# Patient Record
Sex: MALE | Race: BLACK OR AFRICAN AMERICAN | URBAN - METROPOLITAN AREA
[De-identification: names, ages, dates, MRNs, and addresses within clinical notes are randomized per-mention and may not be internally consistent; named-entity substitution may affect disease eponyms.]

---

## 2017-01-09 ENCOUNTER — TELEPHONE (OUTPATIENT)
Dept: NEUROLOGY | Facility: CLINIC | Age: 27
End: 2017-01-09

## 2017-01-09 DIAGNOSIS — G40.209 LOCALZ-RLTD SYMPTOMATIC EPILEPSY W CMPLX PART SZ, NOTINTRAC, WO STATUS (H): Primary | ICD-10-CM

## 2017-01-09 RX ORDER — LEVETIRACETAM 750 MG/1
TABLET ORAL
Qty: 150 TABLET | Refills: 3 | Status: SHIPPED | OUTPATIENT
Start: 2017-01-09 | End: 2017-03-10

## 2017-01-09 NOTE — TELEPHONE ENCOUNTER
----- Message from Marcel Cabrera CMA sent at 1/9/2017  9:21 AM CST -----  Regarding: refill  Drug Name: levETIRAcetam (KEPPRA) 750 MG tablet      Dose: 750mg    generic  SIG: taking 2.5 tabs BID                                 Days Supply Needed:      Pharm[place] [facility] [address] [place] [facility] [address] [facility]    Date of Last Appointment: 12/6/16  Next RTC Date: 3/7/17  Has a script already been sent recently: No    Additonal Notes:

## 2017-03-10 ENCOUNTER — OFFICE VISIT (OUTPATIENT)
Dept: NEUROLOGY | Facility: CLINIC | Age: 27
End: 2017-03-10

## 2017-03-10 VITALS
HEART RATE: 67 BPM | WEIGHT: 223 LBS | SYSTOLIC BLOOD PRESSURE: 135 MMHG | BODY MASS INDEX: 30.2 KG/M2 | HEIGHT: 72 IN | DIASTOLIC BLOOD PRESSURE: 60 MMHG | OXYGEN SATURATION: 98 % | RESPIRATION RATE: 20 BRPM

## 2017-03-10 DIAGNOSIS — G40.219 LOCALIZATION-RELATED SYMPTOMATIC EPILEPSY AND EPILEPTIC SYNDROMES WITH COMPLEX PARTIAL SEIZURES, INTRACTABLE, WITHOUT STATUS EPILEPTICUS (H): Primary | ICD-10-CM

## 2017-03-10 DIAGNOSIS — G40.209 LOCALZ-RLTD SYMPTOMATIC EPILEPSY W CMPLX PART SZ, NOTINTRAC, WO STATUS (H): ICD-10-CM

## 2017-03-10 LAB
DEPRECATED CALCIDIOL+CALCIFEROL SERPL-MC: 52 UG/L (ref 20–75)
SODIUM SERPL-SCNC: 136 MMOL/L (ref 133–144)

## 2017-03-10 RX ORDER — LEVETIRACETAM 750 MG/1
TABLET ORAL
Qty: 150 TABLET | Refills: 11 | Status: SHIPPED | OUTPATIENT
Start: 2017-03-10 | End: 2018-01-26

## 2017-03-10 ASSESSMENT — PAIN SCALES - GENERAL: PAINLEVEL: NO PAIN (0)

## 2017-03-10 NOTE — PROGRESS NOTES
[facility] Epilepsy Care Progress Note      Patient:  India Hylton  :  1990   Age:  26 year old   Today's Office Visit:  3/10/2017    Epilepsy Data:    Patient History  Primary Epileptologist/Provider: Ranjit Goddard M.D.  Patient Status: Not yet controlled  Epilepsy Syndrome: Localization-related epilepsy unspecified  Epilepsy Syndrome Status: Preliminary  Age of Onset: 22yo (22 yo)     Tests/Surgery History  Last EEG: 2016  Last MRI: 2014    Seizure Record  Current Visit Date: 03/10/17  Previous Visit Date: 16  Months since last visit: 3.09    Seizure Type 1: Simple partial onset followed by impairment of consciousness  Description of Sz Type 1: joselyn vu followed by stare, amnesia, automatisms.  # of Type 1 Seizure since last visit: 11  Freq. Type 1 / Month: 3.56    Seizure Type 2: Partial seizures with secondary generalization  - with simple partial seizures evolving to generalized seizures  Description of Sz Type 2: joselyn vu followed by collapse, amnesia, stiffening, jerking, often TB  # of Type 2 Seizure since last visit: 0  Freq. Type 2 / Month: 0    History of Present Illness:     No change in seizure characteristics. No convulsive seizures.  Complex partial seizures continue and he keeps a close record on his smart phone.  Numbers have come down from about 6 per month prior to oxcarbazepine to about 3 per month as we optimized carbamazepine.  Nonetheless continues with seizures with impairment.    Last visit suggested formal education regarding surgery. This was ordered but he did not arrange it. Reports that he feels he is doing well.  Reports he does not feel ready for surgery at this time.    Current Outpatient Prescriptions   Medication Sig Dispense Refill     levETIRAcetam (KEPPRA) 750 MG tablet Take two and one half tablets twice per day (total 3750 mg per day) 150 tablet 3     OXcarbazepine (TRILEPTAL) 300 MG tablet Inicrease as instructed until taking four in AM and  four and one half in PM (total 2550 mg per day) 255 tablet 11        Medication Notes:    Taking OXC [phone] (2550/D)  AED Side Effects Discussed: Yes   AED Medication Compliance:  compliant all of the time  Using a pill box:  Yes    Review of Systems:  Lethargy / Tiredness:  No  Nausea / Vomiting:  No  Double Vision:  No  Sleepiness:  No  Depression:  No  Slowed Cognitive Function:  No  Memory Problems:  No  Poor Balance:  No  Dizziness:  No  Appetite Changes:  No  Blurred Vision:  No  Decreased Libido:  No  Sleep Changes:  No  Behavioral Changes:  No  Skin: negative  Respiratory: No shortness of breath  Cardiovascular: negative  Have you experienced a traumatic fall since your last visit: NO  Are these falls related to your seizures: Not Applicable    Other Issues:    No other medical problems. MMood doing OK. Not suicidal.  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 Takes green lilne to work.  Is patient safe to drive:  No    Exam:    /60  Pulse 67  Resp 20  Ht 6' (182.9 cm)  Wt 223 lb (101.2 kg)  SpO2 98%  BMI 30.24 kg/m2     Wt Readings from Last 5 Encounters:   03/10/17 223 lb (101.2 kg)   12/06/16 230 lb (104.3 kg)   06/23/16 230 lb 6.4 oz (104.5 kg)   03/18/16 233 lb (105.7 kg)     PHYSICAL EXAMINATION:  Normal straightaway gait including tandem, Romberg, 1-foot stand.  Visual fields are full.  Extraocular movements intact.  Smile symmetrical.  Facial sensation equal.  Tongue is midline and strong without signs laceration.  There is no drift, pronation or tremor.  Proximal and distal strength full in upper and lower extremities.  Finger-finger-nose and heel-knee-shin is done well. He very pleasant and cooperative young man who asks thoughtful questions and exhibits greater maturity than one might expect for his age.  Mental status exam is grossly intact and no obvious indications of depression or anxiety on examination.        IMPRESSION:    1.  Good  "story for localization-related epilepsy; simple to complex partial seizures, occasional secondary generalization.  Clinical features are consistent with temporal lobe onset or early temporal lobe propagation.  The clinical features suggest more a nondominant than a dominant temporal lobe onset.  EEG at [facility] with left temporal irritative zone EEG done at [facility] suggests right temporal irritative zone.  He could have bitemporal epilepsy.  Outside MRI normal; not clear this is a high quality study; if normal may have neocortical epilepsy. History not strongly suggestive of mesial temporal epilepsy.  2.  Refractory to phenytoin, levetiracetam, high dose lacosamide and now oxcarbazepine. Has done best with oxcarbazepine levetiracetam but clearly maximized and not seizure free.      DISCUSSION:  Again extensive discussion about treatment options. He medically refractory and further AED treatment not likely to help. On the other hand probably does not have mesiobasal epilepsy and would require grids tho if has unilateral temporal neocortical epilepsy prognosis for cure may be reasonable. We again encouraged presurgical evaluation starting with 3T epilepsy directed MRI. He again remains unexcited about pursuing this at this time. We discussed potential sequellae of chronic partial seizures including cognitive deficits, loss of opportunities that may have a serious vocational and personal impact over time. He acknowledged this but stated that thinking about surgery was \"too much\". We suggested surgery education and he was open to this. He experessed interest in medical cannabis; we talked about the ongoing study and he expressed interest in this. email is [email or web address]; cell [phone].     PLAN:    1.  Continue current levetiracetam and oxcarbazepine.  2. RTC 4 monhts. If no control, we will again suggest evaluation for potential resective epilepsy surgery.  This would entail inpatient video-EEG " monitoring, 3 Roxana epilepsy directed MRI, PET scanning, neuropsychometric testing and intercarotid amytal testing as needed.  3. Will discuss with Dr Carvalho who is PI for Cannabis Epilepsy study whether he is a candidate.  4.  Lamotrigine would appropriate anticonvulsants to consider in the future if patient chooses more anticonvulsant medication treatments.   5. AED levels to confirm compliance, rule out toxicity. Sodium to exclude side effects. Vitamin D level. He currently takes Vit D 2000 IU per day.         KARIS FRANK MD

## 2017-03-10 NOTE — MR AVS SNAPSHOT
After Visit Summary   3/10/2017    India Hylton    MRN: [id number]           Patient Information     Date Of Birth          1990        Visit Information        Provider Department      3/10/2017 2:45 PM Ranjit Goddard MD [facility] Neurology        Today's Diagnoses     Localization-related symptomatic epilepsy and epileptic syndromes with complex partial seizures, intractable, without status epilepticus (H)    -  1    Localz-rltd symptomatic epilepsy w cmplx part sz, notintrac, wo status (H)           Follow-ups after your visit        Follow-up notes from your care team     Return in about 6 months (around 9/10/2017).      Your next 10 appointments already scheduled     Mar 10, 2017  3:45 PM CST   LAB with  LAB   [facility] Lab ([facility])    [address] [place] [id number] [phone]           Patient must bring picture ID.  Patient should be prepared to give a urine specimen  Please do not eat 10-12 hours before your appointment if you are coming in fasting for labs on lipids, cholesterol, or glucose (sugar).  Pregnant women should follow their Care Team instructions. Water with medications is okay. Do not drink coffee or other fluids.   If you have concerns about taking  your medications, please ask at office or if scheduling via Ruckus, send a message by clicking on Secure Messaging, Message Your Care Team.            Sep 22, 2017  2:45 PM CDT   (Arrive by 2:30 PM)   Return Seizure with Ranjit Goddard MD   [facility] Neurology ([facility])    [address] [place] [id number] [phone]              Who to contact     Please call your clinic at [phone] to:    Ask questions about your health    Make or cancel appointments    Discuss your medicines    Learn about your test results    Speak to your doctor   If you have compliments or concerns about an  experience at your clinic, or if you wish to file a complaint, please contact [facility] Patient Relations at [phone] or email us at [email or web address]         Additional Information About Your Visit        iHandlehart Information     SilverPush is an electronic gateway that provides easy, online access to your medical records. With SilverPush, you can request a clinic appointment, read your test results, renew a prescription or communicate with your care team.     To sign up for SilverPush visit the website at [email or web address], a [email or web address] of [email or web address]"t   You will be asked to enter the access code listed below, as well as some personal information. Please follow the directions to create your username and password.     Your access code is: [email or web address]  Expires: 2017  6:30 AM     Your access code will  in 90 days. If you need help or a new code, please contact your [facility] or call [phone] for assistance.        Care EveryWhere ID     This is your Care EveryWhere ID. This could be used by other organizations to access your [place] medical records  [id number]        Your Vitals Were     Pulse Respirations Height Pulse Oximetry BMI (Body Mass Index)       67 20 1.829 m (6') 98% 30.24 kg/m2        Blood Pressure from Last 3 Encounters:   03/10/17 135/60   16 142/74   16 138/66    Weight from Last 3 Encounters:   03/10/17 101.2 kg (223 lb)   16 104.3 kg (230 lb)   16 104.5 kg (230 lb 6.4 oz)              We Performed the Following     Levetiracetam level     Oxcarbazepine level     Sodium  Serum ([facility])     Vitamin D Deficiency (Calcifediol)          Where to get your medicines      These medications were sent to [facility] [address] [place] [facility] [address] [facility]  [address] [facility] [address]     Phone:  [phone]     levETIRAcetam 750 MG tablet          Primary Care Provider    Physician No  Ref-Primary       No address on file        Thank you!     Thank you for choosing [facility] NEUROLOGY  for your care. Our goal is always to provide you with excellent care. Hearing back from our patients is one way we can continue to improve our services. Please take a few minutes to complete the written survey that you may receive in the mail after your visit with us. Thank you!             Your Updated Medication List - Protect others around you: Learn how to safely use, store and throw away your medicines at www.disposemymeds.org.          This list is accurate as of: 3/10/17  3:30 PM.  Always use your most recent med list.                   Brand Name Dispense Instructions for use    levETIRAcetam 750 MG tablet    KEPPRA    150 tablet    Take two and one half tablets twice per day (total 3750 mg per day)       OXcarbazepine 300 MG tablet    TRILEPTAL    255 tablet    Inicrease as instructed until taking four in AM and four and one half in PM (total 2550 mg per day)

## 2017-03-10 NOTE — LETTER
3/10/2017       RE: India Hylton  [address]  [facility] [address]     Dear Colleague,    Thank you for referring your patient, India Hylton, to the [facility] NEUROLOGY at [facility]. Please see a copy of my visit note below.    [facility] Epilepsy Care Progress Note      Patient:  India Hylton  :  1990   Age:  26 year old   Today's Office Visit:  3/10/2017    Epilepsy Data:    Patient History  Primary Epileptologist/Provider: Ranjit Goddard M.D.  Patient Status: Not yet controlled  Epilepsy Syndrome: Localization-related epilepsy unspecified  Epilepsy Syndrome Status: Preliminary  Age of Onset: 22yo (22 yo)     Tests/Surgery History  Last EEG: 2016  Last MRI: 2014    Seizure Record  Current Visit Date: 03/10/17  Previous Visit Date: 16  Months since last visit: 3.09    Seizure Type 1: Simple partial onset followed by impairment of consciousness  Description of Sz Type 1: joselyn vu followed by stare, amnesia, automatisms.  # of Type 1 Seizure since last visit: 11  Freq. Type 1 / Month: 3.56    Seizure Type 2: Partial seizures with secondary generalization  - with simple partial seizures evolving to generalized seizures  Description of Sz Type 2: joselyn vu followed by collapse, amnesia, stiffening, jerking, often TB  # of Type 2 Seizure since last visit: 0  Freq. Type 2 / Month: 0    History of Present Illness:     No change in seizure characteristics. No convulsive seizures.  Complex partial seizures continue and he keeps a close record on his smart phone.  Numbers have come down from about 6 per month prior to oxcarbazepine to about 3 per month as we optimized carbamazepine.  Nonetheless continues with seizures with impairment.    Last visit suggested formal education regarding surgery. This was ordered but he did not arrange it. Reports that he feels he is doing well.  Reports he does not feel ready for surgery at this  time.    Current Outpatient Prescriptions   Medication Sig Dispense Refill     levETIRAcetam (KEPPRA) 750 MG tablet Take two and one half tablets twice per day (total 3750 mg per day) 150 tablet 3     OXcarbazepine (TRILEPTAL) 300 MG tablet Inicrease as instructed until taking four in AM and four and one half in PM (total 2550 mg per day) 255 tablet 11        Medication Notes:    Taking OXC [phone] (2550/D)  AED Side Effects Discussed: Yes   AED Medication Compliance:  compliant all of the time  Using a pill box:  Yes    Other Issues:    No other medical problems. MMood doing OK. Not suicidal.  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  Is patient safe to drive:  No    Exam:    /60  Pulse 67  Resp 20  Ht 6' (182.9 cm)  Wt 223 lb (101.2 kg)  SpO2 98%  BMI 30.24 kg/m2     Wt Readings from Last 5 Encounters:   03/10/17 223 lb (101.2 kg)   12/06/16 230 lb (104.3 kg)   06/23/16 230 lb 6.4 oz (104.5 kg)   03/18/16 233 lb (105.7 kg)     PHYSICAL EXAMINATION:  Normal straightaway gait including tandem, Romberg, 1-foot stand.  Visual fields are full.  Extraocular movements intact.  Smile symmetrical.  Facial sensation equal.  Tongue is midline and strong without signs laceration.  There is no drift, pronation or tremor.  Proximal and distal strength full in upper and lower extremities.  Finger-finger-nose and heel-knee-shin is done well. He very pleasant and cooperative young man who asks thoughtful questions and exhibits greater maturity than one might expect for his age.  Mental status exam is grossly intact and no obvious indications of depression or anxiety on examination.        IMPRESSION:    1.  Good story for localization-related epilepsy; simple to complex partial seizures, occasional secondary generalization.  Clinical features are consistent with temporal lobe onset or early temporal lobe propagation.  The clinical features suggest  "more a nondominant than a dominant temporal lobe onset.  EEG at [facility] with left temporal irritative zone EEG done at [facility] suggests right temporal irritative zone.  He could have bitemporal epilepsy.  Outside MRI normal; not clear this is a high quality study; if normal may have neocortical epilepsy. History not strongly suggestive of mesial temporal epilepsy.  2.  Refractory to phenytoin, levetiracetam, high dose lacosamide and now oxcarbazepine. Has done best with oxcarbazepine levetiracetam but clearly maximized and not seizure free.      DISCUSSION:  Again extensive discussion about treatment options. He medically refractory and further AED treatment not likely to help. On the other hand probably does not have mesiobasal epilepsy and would require grids tho if has unilateral temporal neocortical epilepsy prognosis for cure may be reasonable. We again encouraged presurgical evaluation starting with 3T epilepsy directed MRI. He again remains unexcited about pursuing this at this time. We discussed potential sequellae of chronic partial seizures including cognitive deficits, loss of opportunities that may have a serious vocational and personal impact over time. He acknowledged this but stated that thinking about surgery was \"too much\". We suggested surgery education and he was open to this. He experessed interest in medical cannabis; we talked about the ongoing study and he expressed interest in this. email is [email or web address]; cell [phone].     PLAN:    1.  Continue current levetiracetam and oxcarbazepine.  2. RTC 4 monhts. If no control, we will again suggest evaluation for potential resective epilepsy surgery.  This would entail inpatient video-EEG monitoring, 3 Roxana epilepsy directed MRI, PET scanning, neuropsychometric testing and intercarotid amytal testing as needed.  3. Will discuss with Dr Carvalho who is PI for Cannabis Epilepsy study whether he is a candidate.  4.  Lamotrigine would " appropriate anticonvulsants to consider in the future if patient chooses more anticonvulsant medication treatments.   5. AED levels to confirm compliance, rule out toxicity. Sodium to exclude side effects. Vitamin D level. He currently takes Vit D 2000 IU per day.     KARIS FRANK MD

## 2017-03-14 LAB
10OH-CARBAZEPINE SERPL-MCNC: 28.7 UG/ML
LEVETIRACETAM SERPL-MCNC: 25.2 UG/ML

## 2017-04-03 ENCOUNTER — OFFICE VISIT (OUTPATIENT)
Dept: NEUROLOGY | Facility: CLINIC | Age: 27
End: 2017-04-03

## 2017-04-03 VITALS
DIASTOLIC BLOOD PRESSURE: 87 MMHG | SYSTOLIC BLOOD PRESSURE: 151 MMHG | BODY MASS INDEX: 30.61 KG/M2 | WEIGHT: 226 LBS | HEIGHT: 72 IN | HEART RATE: 62 BPM

## 2017-04-03 DIAGNOSIS — G40.219 LOCALIZATION-RELATED SYMPTOMATIC EPILEPSY AND EPILEPTIC SYNDROMES WITH COMPLEX PARTIAL SEIZURES, INTRACTABLE, WITHOUT STATUS EPILEPTICUS (H): Primary | ICD-10-CM

## 2017-04-03 NOTE — MR AVS SNAPSHOT
After Visit Summary   4/3/2017    India Hylton    MRN: [id number]           Patient Information     Date Of Birth          1990        Visit Information        Provider Department      4/3/2017 9:30 AM Alonzo Carvalho MD [facility] Epilepsy Care         Follow-ups after your visit        Your next 10 appointments already scheduled     Sep 22, 2017  2:45 PM CDT   (Arrive by 2:30 PM)   Return Seizure with Ranjit Goddard MD   [facility] Neurology ([facility])    [address] [place] [address] [phone]              Who to contact     Please call your clinic at [phone] to:    Ask questions about your health    Make or cancel appointments    Discuss your medicines    Learn about your test results    Speak to your doctor   If you have compliments or concerns about an experience at your clinic, or if you wish to file a complaint, please contact [facility] Patient Relations at [phone] or email us at [email or web address]         Additional Information About Your Visit        MyChart Information     Ahometo is an electronic gateway that provides easy, online access to your medical records. With Ahometo, you can request a clinic appointment, read your test results, renew a prescription or communicate with your care team.     To sign up for Ahometo visit the website at [email or web address]   You will be asked to enter the access code listed below, as well as some personal information. Please follow the directions to create your username and password.     Your access code is: [email or web address]  Expires: 2017  7:30 AM     Your access code will  in 90 days. If you need help or a new code, please contact your [facility] or call [phone] for assistance.        Care EveryWhere ID     This is your Care EveryWhere ID. This could be used by other  "organizations to access your [place] medical records  [id number]        Your Vitals Were     Pulse Height BMI (Body Mass Index)             62 6' 0.01\" (182.9 cm) 30.64 kg/m2          Blood Pressure from Last 3 Encounters:   04/03/17 151/87   03/10/17 135/60   12/06/16 142/74    Weight from Last 3 Encounters:   04/03/17 226 lb (102.5 kg)   03/10/17 223 lb (101.2 kg)   12/06/16 230 lb (104.3 kg)              Today, you had the following     No orders found for display         Today's Medication Changes          These changes are accurate as of: 4/3/17 10:08 AM.  If you have any questions, ask your nurse or doctor.               These medicines have changed or have updated prescriptions.        Dose/Directions    OXcarbazepine 300 MG tablet   Commonly known as:  TRILEPTAL   This may have changed:  additional instructions   Used for:  Localization-related (focal) (partial) symptomatic epilepsy and epileptic syndromes with complex partial seizures, not intractable, without status epilepticus (H)        Inicrease as instructed until taking four in AM and four and one half in PM (total 2550 mg per day)   Quantity:  255 tablet   Refills:  11                Primary Care Provider    Physician No Ref-Primary       No address on file        Thank you!     Thank you for choosing [facility]  for your care. Our goal is always to provide you with excellent care. Hearing back from our patients is one way we can continue to improve our services. Please take a few minutes to complete the written survey that you may receive in the mail after your visit with us. Thank you!             Your Updated Medication List - Protect others around you: Learn how to safely use, store and throw away your medicines at www.disposemymeds.org.          This list is accurate as of: 4/3/17 10:08 AM.  Always use your most recent med list.                   Brand Name Dispense Instructions for use    levETIRAcetam 750 MG tablet    KEPPRA    " 150 tablet    Take two and one half tablets twice per day (total 3750 mg per day)       OXcarbazepine 300 MG tablet    TRILEPTAL    255 tablet    Inicrease as instructed until taking four in AM and four and one half in PM (total 2550 mg per day)

## 2017-04-03 NOTE — LETTER
4/3/2017       RE: India Hylton  : 1990   MRN: [id number]      Dear Colleague,    Thank you for referring your patient, India Hylton, to the [facility]PS[facility]. Please see a copy of my visit note below.    [facility] [place] [facility]      Patient:  India Hylton  :  1990   Age:  26 year old   Today's Office Visit:  3/10/2017    Epilepsy Data:    Patient History  Primary Epileptologist/Provider: Ranjit Goddard M.D.  Patient Status: Not yet controlled  Epilepsy Syndrome: Localization-related epilepsy unspecified  Epilepsy Syndrome Status: Preliminary  Age of Onset: 24yo (22 yo)     Tests/Surgery History  Last EEG: 2016  Last MRI: 2014    Seizure Record  Current Visit Date: 03/10/17  Previous Visit Date: 16  Months since last visit: 3.09    Seizure Type 1: Simple partial onset followed by impairment of consciousness  Description of Sz Type 1: joselyn vu followed by stare, amnesia, automatisms.  # of Type 1 Seizure since last visit: 11  Freq. Type 1 / Month: 3.56    Seizure Type 2: Partial seizures with secondary generalization  - with simple partial seizures evolving to generalized seizures  Description of Sz Type 2: joselyn vu followed by collapse, amnesia, stiffening, jerking, often TB  # of Type 2 Seizure since last visit: 0  Freq. Type 2 / Month: 0    History of Present Illness:     No change in seizure characteristics. No convulsive seizures.  Complex partial seizures continue and he keeps a close record on his smart phone.  Numbers have come down from about 6 per month prior to oxcarbazepine to about 3 per month as we optimized carbamazepine.  Nonetheless continues with seizures with impairment.    Last visit suggested formal education regarding surgery. This was ordered but he did not arrange it. Reports that he feels he is doing well.  Reports he does not feel ready for surgery at this  "time.    Current Outpatient Prescriptions   Medication Sig Dispense Refill     levETIRAcetam (KEPPRA) 750 MG tablet Take two and one half tablets twice per day (total 3750 mg per day) 150 tablet 11     OXcarbazepine (TRILEPTAL) 300 MG tablet Inicrease as instructed until taking four in AM and four and one half in PM (total 2550 mg per day) (Patient taking differently: 4 tabs in the am and 4.5 tabs in the hs) 255 tablet 11        Medication Notes:    Taking OXC [phone] (2550/D)  AED Side Effects Discussed: Yes   AED Medication Compliance:  compliant all of the time  Using a pill box:  Yes    Review of Systems:  Lethargy / Tiredness:  No  Nausea / Vomiting:  No  Double Vision:  No  Sleepiness:  No  Depression:  No  Slowed Cognitive Function:  No  Memory Problems:  No  Poor Balance:  No  Dizziness:  No  Appetite Changes:  No  Blurred Vision:  No  Decreased Libido:  No  Sleep Changes:  No  Behavioral Changes:  No  Skin: negative  Respiratory: No shortness of breath  Cardiovascular: negative  Have you experienced a traumatic fall since your last visit: NO  Are these falls related to your seizures: Not Applicable    Other Issues:    No other medical problems. MMood doing OK. Not suicidal.  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 Takes green lilne to work.  Is patient safe to drive:  No    Exam:    /87  Pulse 62  Ht 6' 0.01\" (182.9 cm)  Wt 226 lb (102.5 kg)  BMI 30.64 kg/m2     Wt Readings from Last 5 Encounters:   04/03/17 226 lb (102.5 kg)   03/10/17 223 lb (101.2 kg)   12/06/16 230 lb (104.3 kg)   06/23/16 230 lb 6.4 oz (104.5 kg)   03/18/16 233 lb (105.7 kg)     PHYSICAL EXAMINATION:  Normal straightaway gait including tandem, Romberg, 1-foot stand.  Visual fields are full.  Extraocular movements intact.  Smile symmetrical.  Facial sensation equal.  Tongue is midline and strong without signs laceration.  There is no drift, pronation or tremor.  Proximal and " "distal strength full in upper and lower extremities.  Finger-finger-nose and heel-knee-shin is done well. He very pleasant and cooperative young man who asks thoughtful questions and exhibits greater maturity than one might expect for his age.  Mental status exam is grossly intact and no obvious indications of depression or anxiety on examination.        IMPRESSION:    1.  Good story for localization-related epilepsy; simple to complex partial seizures, occasional secondary generalization.  Clinical features are consistent with temporal lobe onset or early temporal lobe propagation.  The clinical features suggest more a nondominant than a dominant temporal lobe onset.  EEG at [facility] with left temporal irritative zone EEG done at [facility] suggests right temporal irritative zone.  He could have bitemporal epilepsy.  Outside MRI normal; not clear this is a high quality study; if normal may have neocortical epilepsy. History not strongly suggestive of mesial temporal epilepsy.  2.  Refractory to phenytoin, levetiracetam, high dose lacosamide and now oxcarbazepine. Has done best with oxcarbazepine levetiracetam but clearly maximized and not seizure free.      DISCUSSION:  Again extensive discussion about treatment options. He medically refractory and further AED treatment not likely to help. On the other hand probably does not have mesiobasal epilepsy and would require grids tho if has unilateral temporal neocortical epilepsy prognosis for cure may be reasonable. We again encouraged presurgical evaluation starting with 3T epilepsy directed MRI. He again remains unexcited about pursuing this at this time. We discussed potential sequellae of chronic partial seizures including cognitive deficits, loss of opportunities that may have a serious vocational and personal impact over time. He acknowledged this but stated that thinking about surgery was \"too much\". We suggested surgery education and he was open to this. " He experessed interest in medical cannabis; we talked about the ongoing study and he expressed interest in this. email is [email or web address]; cell [phone].     PLAN:    1.  Continue current levetiracetam and oxcarbazepine.  2. RTC 4 monhts. If no control, we will again suggest evaluation for potential resective epilepsy surgery.  This would entail inpatient video-EEG monitoring, 3 Roxana epilepsy directed MRI, PET scanning, neuropsychometric testing and intercarotid amytal testing as needed.  3. Will discuss with Dr Carvalho who is PI for Cannabis Epilepsy study whether he is a candidate.  4.  Lamotrigine would appropriate anticonvulsants to consider in the future if patient chooses more anticonvulsant medication treatments.   5. AED levels to confirm compliance, rule out toxicity. Sodium to exclude side effects. Vitamin D level. He currently takes Vit D 2000 IU per day.         KARIS FRANK MD       Again, thank you for allowing me to participate in the care of your patient.      Sincerely,    Alonzo Carvalho MD

## 2017-04-18 NOTE — PROGRESS NOTES
[facility] Epilepsy [facility] Progress Note      Patient:  India Hylton  :  1990   Age:  26 year old   Today's Office Visit:  3/10/2017    Epilepsy Data:    Patient History  Primary Epileptologist/Provider: Ranjit Goddard M.D.  Patient Status: Not yet controlled  Epilepsy Syndrome: Localization-related epilepsy unspecified  Epilepsy Syndrome Status: Preliminary  Age of Onset: 24yo (22 yo)     Tests/Surgery History  Last EEG: 2016  Last MRI: 2014    Seizure Record  Current Visit Date: 03/10/17  Previous Visit Date: 16  Months since last visit: 3.09    Seizure Type 1: Simple partial onset followed by impairment of consciousness  Description of Sz Type 1: joselyn vu followed by stare, amnesia, automatisms.  # of Type 1 Seizure since last visit: 11  Freq. Type 1 / Month: 3.56    Seizure Type 2: Partial seizures with secondary generalization  - with simple partial seizures evolving to generalized seizures  Description of Sz Type 2: joselyn vu followed by collapse, amnesia, stiffening, jerking, often TB  # of Type 2 Seizure since last visit: 0  Freq. Type 2 / Month: 0    History of Present Illness:     No change in seizure characteristics. No convulsive seizures.  Complex partial seizures continue and he keeps a close record on his smart phone.  Numbers have come down from about 6 per month prior to oxcarbazepine to about 3 per month as we optimized carbamazepine.  Nonetheless continues with seizures with impairment.    Last visit suggested formal education regarding surgery. This was ordered but he did not arrange it. Reports that he feels he is doing well.  Reports he does not feel ready for surgery at this time.    Current Outpatient Prescriptions   Medication Sig Dispense Refill     levETIRAcetam (KEPPRA) 750 MG tablet Take two and one half tablets twice per day (total 3750 mg per day) 150 tablet 11     OXcarbazepine (TRILEPTAL) 300 MG tablet Inicrease as instructed until taking four in AM and  "four and one half in PM (total 2550 mg per day) (Patient taking differently: 4 tabs in the am and 4.5 tabs in the hs) 255 tablet 11        Medication Notes:    Taking OXC [phone] (2550/D)  AED Side Effects Discussed: Yes   AED Medication Compliance:  compliant all of the time  Using a pill box:  Yes    Review of Systems:  Lethargy / Tiredness:  No  Nausea / Vomiting:  No  Double Vision:  No  Sleepiness:  No  Depression:  No  Slowed Cognitive Function:  No  Memory Problems:  No  Poor Balance:  No  Dizziness:  No  Appetite Changes:  No  Blurred Vision:  No  Decreased Libido:  No  Sleep Changes:  No  Behavioral Changes:  No  Skin: negative  Respiratory: No shortness of breath  Cardiovascular: negative  Have you experienced a traumatic fall since your last visit: NO  Are these falls related to your seizures: Not Applicable    Other Issues:    No other medical problems. MMood doing OK. Not suicidal.  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  Is patient safe to drive:  No    Exam:    /87  Pulse 62  Ht 6' 0.01\" (182.9 cm)  Wt 226 lb (102.5 kg)  BMI 30.64 kg/m2     Wt Readings from Last 5 Encounters:   04/03/17 226 lb (102.5 kg)   03/10/17 223 lb (101.2 kg)   12/06/16 230 lb (104.3 kg)   06/23/16 230 lb 6.4 oz (104.5 kg)   03/18/16 233 lb (105.7 kg)     PHYSICAL EXAMINATION:  Normal straightaway gait including tandem, Romberg, 1-foot stand.  Visual fields are full.  Extraocular movements intact.  Smile symmetrical.  Facial sensation equal.  Tongue is midline and strong without signs laceration.  There is no drift, pronation or tremor.  Proximal and distal strength full in upper and lower extremities.  Finger-finger-nose and heel-knee-shin is done well. He very pleasant and cooperative young man who asks thoughtful questions and exhibits greater maturity than one might expect for his age.  Mental status exam is grossly intact and no " "obvious indications of depression or anxiety on examination.        IMPRESSION:    1.  Good story for localization-related epilepsy; simple to complex partial seizures, occasional secondary generalization.  Clinical features are consistent with temporal lobe onset or early temporal lobe propagation.  The clinical features suggest more a nondominant than a dominant temporal lobe onset.  EEG at [facility][1] with left temporal irritative zone EEG done at [facility] suggests right temporal irritative zone.  He could have bitemporal epilepsy.  Outside MRI normal; not clear this is a high quality study; if normal may have neocortical epilepsy. History not strongly suggestive of mesial temporal epilepsy.  2.  Refractory to phenytoin, levetiracetam, high dose lacosamide and now oxcarbazepine. Has done best with oxcarbazepine levetiracetam but clearly maximized and not seizure free.      DISCUSSION:  Again extensive discussion about treatment options. He medically refractory and further AED treatment not likely to help. On the other hand probably does not have mesiobasal epilepsy and would require grids tho if has unilateral temporal neocortical epilepsy prognosis for cure may be reasonable. We again encouraged presurgical evaluation starting with 3T epilepsy directed MRI. He again remains unexcited about pursuing this at this time. We discussed potential sequellae of chronic partial seizures including cognitive deficits, loss of opportunities that may have a serious vocational and personal impact over time. He acknowledged this but stated that thinking about surgery was \"too much\". We suggested surgery education and he was open to this. He experessed interest in medical cannabis; we talked about the ongoing study and he expressed interest in this. email is [email or web address]; cell [phone].     PLAN:    1.  Continue current levetiracetam and oxcarbazepine.  2. RTC 4 monhts. If no control, we will again suggest " evaluation for potential resective epilepsy surgery.  This would entail inpatient video-EEG monitoring, 3 Roxana epilepsy directed MRI, PET scanning, neuropsychometric testing and intercarotid amytal testing as needed.  3. Will discuss with Dr Carvalho who is PI for Cannabis Epilepsy study whether he is a candidate.  4.  Lamotrigine would appropriate anticonvulsants to consider in the future if patient chooses more anticonvulsant medication treatments.   5. AED levels to confirm compliance, rule out toxicity. Sodium to exclude side effects. Vitamin D level. He currently takes Vit D 2000 IU per day.         KARIS FRANK MD

## 2017-04-24 DIAGNOSIS — G40.219 LOCALIZATION-RELATED SYMPTOMATIC EPILEPSY AND EPILEPTIC SYNDROMES WITH COMPLEX PARTIAL SEIZURES, INTRACTABLE, WITHOUT STATUS EPILEPTICUS (H): ICD-10-CM

## 2017-04-24 LAB — SODIUM SERPL-SCNC: 140 MMOL/L (ref 133–144)

## 2017-04-25 LAB
10OH-CARBAZEPINE SERPL-MCNC: 31.4 UG/ML
LEVETIRACETAM SERPL-MCNC: 47.8 UG/ML

## 2017-05-30 ENCOUNTER — OFFICE VISIT (OUTPATIENT)
Dept: NEUROLOGY | Facility: CLINIC | Age: 27
End: 2017-05-30

## 2017-05-30 DIAGNOSIS — G40.219 LOCALIZATION-RELATED SYMPTOMATIC EPILEPSY AND EPILEPTIC SYNDROMES WITH COMPLEX PARTIAL SEIZURES, INTRACTABLE, WITHOUT STATUS EPILEPTICUS (H): Primary | ICD-10-CM

## 2017-05-30 NOTE — MR AVS SNAPSHOT
After Visit Summary   2017    India Hylton    MRN: [id number]           Patient Information     Date Of Birth          1990        Visit Information        Provider Department      2017 10:00 AM Alon[facility] Carvalho [facility]        Today's Diagnoses     Localization-related symptomatic epilepsy and epileptic syndromes with complex partial seizures, intractable, without status epilepticus (H)    -  1       Follow-ups after your visit        Your next 10 appointments already scheduled     Sep 22, 2017  2:45 PM CDT   (Arrive by 2:30 PM)   Return Seizure with Ranjit Goddard MD   [facility] ([facility] [place])    [address] [place] [address]  3rd [facility] [address] [phone]              Who to contact     Please call your clinic at [phone] to:    Ask questions about your health    Make or cancel appointments    Discuss your medicines    Learn about your test results    Speak to your doctor   If you have compliments or concerns about an experience at your clinic, or if you wish to file a complaint, please contact [facility] Patient Relations at [phone] or email us at [email or web address]         Additional Information About Your Visit        MyChart Information     [employer] is an electronic gateway that provides easy, online access to your medical records. With [employer], you can request a clinic appointment, read your test results, renew a prescription or communicate with your care team.     To sign up for [employer] visit the website at [email or web address]   You will be asked to enter the access code listed below, as well as some personal information. Please follow the directions to create your username and password.     Your access code is: [email or web address]  Expires: 2017  7:30 AM     Your access code will  in 90 days. If you need help or a new code, please contact your  [facility] or call [phone] for assistance.        Care EveryWhere ID     This is your Care EveryWhere ID. This could be used by other organizations to access your [facility] medical records  [id number]         Blood Pressure from Last 3 Encounters:   04/03/17 151/87   03/10/17 135/60   12/06/16 142/74    Weight from Last 3 Encounters:   04/03/17 226 lb (102.5 kg)   03/10/17 223 lb (101.2 kg)   12/06/16 230 lb (104.3 kg)              Today, you had the following     No orders found for display         Today's Medication Changes          These changes are accurate as of: 5/30/17 11:59 PM.  If you have any questions, ask your nurse or doctor.               These medicines have changed or have updated prescriptions.        Dose/Directions    OXcarbazepine 300 MG tablet   Commonly known as:  TRILEPTAL   This may have changed:  additional instructions   Used for:  Localization-related (focal) (partial) symptomatic epilepsy and epileptic syndromes with complex partial seizures, not intractable, without status epilepticus (H)        Inicrease as instructed until taking four in AM and four and one half in PM (total 2550 mg per day)   Quantity:  255 tablet   Refills:  11                Primary Care Provider    Physician No Ref-Primary       No address on file        Thank you!     Thank you for choosing [facility] EPILEPSY [facility]  for your care. Our goal is always to provide you with excellent care. Hearing back from our patients is one way we can continue to improve our services. Please take a few minutes to complete the written survey that you may receive in the mail after your visit with us. Thank you!             Your Updated Medication List - Protect others around you: Learn how to safely use, store and throw away your medicines at www.disposemymeds.org.          This list is accurate as of: 5/30/17 11:59 PM.  Always use your most recent med list.                   Brand Name Dispense  Instructions for use    levETIRAcetam 750 MG tablet    KEPPRA    150 tablet    Take two and one half tablets twice per day (total 3750 mg per day)       OXcarbazepine 300 MG tablet    TRILEPTAL    255 tablet    Inicrease as instructed until taking four in AM and four and one half in PM (total 2550 mg per day)

## 2017-05-30 NOTE — LETTER
2017       RE: India Hylton  : 1990   MRN: [id number]      Dear Colleague,    Thank you for referring your patient, India Hylton, to the [facility] EPILEPSY CARE at [facility]. Please see a copy of my visit note below.    [facility] [facility]      Patient:  India Hylton  :  1990   Age:  26 year old   Today's Office Visit: 2017    History sincwe last visit:  He completed the trial of CBD with food and without food and 18 days of 100 tid. He had no seizures during the 18 days; usually would have 1 or 2 in this interval. Also feels mood was better.  Epilepsy Data:    Patient History  Primary Epileptologist/Provider: Ranjit Goddard M.D.  Patient Status: Not yet controlled  Epilepsy Syndrome: Localization-related epilepsy unspecified  Epilepsy Syndrome Status: Preliminary  Age of Onset: 22yo (22 yo)     Tests/Surgery History  Last EEG: 2016  Last MRI: 2014    Seizure Record  Current Visit Date: 03/10/17  Previous Visit Date: 16  Months since last visit: 3.09    Seizure Type 1: Simple partial onset followed by impairment of consciousness  Description of Sz Type 1: joselyn vu followed by stare, amnesia, automatisms.  # of Type 1 Seizure since last visit: 11  Freq. Type 1 / Month: 3.56    Seizure Type 2: Partial seizures with secondary generalization  - with simple partial seizures evolving to generalized seizures  Description of Sz Type 2: joselyn vu followed by collapse, amnesia, stiffening, jerking, often TB  # of Type 2 Seizure since last visit: 0  Freq. Type 2 / Month: 0    History of Present Illness:     No change in seizure characteristics. No convulsive seizures.  Complex partial seizures continue and he keeps a close record on his smart phone.  Numbers have come down from about 6 per month prior to oxcarbazepine to about 3 per month as we optimized carbamazepine.  Nonetheless continues with seizures with  impairment.    Last visit suggested formal education regarding surgery. This was ordered but he did not arrange it. Reports that he feels he is doing well.  Reports he does not feel ready for surgery at this time.    Current Outpatient Prescriptions   Medication Sig Dispense Refill     levETIRAcetam (KEPPRA) 750 MG tablet Take two and one half tablets twice per day (total 3750 mg per day) 150 tablet 11     OXcarbazepine (TRILEPTAL) 300 MG tablet Inicrease as instructed until taking four in AM and four and one half in PM (total 2550 mg per day) (Patient taking differently: 4 tabs in the am and 4.5 tabs in the hs) 255 tablet 11        Medication Notes:    Taking OXC [phone] (2550/D)  AED Side Effects Discussed: Yes   AED Medication Compliance:  compliant all of the time  Using a pill box:  Yes    Review of Systems:  Lethargy / Tiredness:  No  Nausea / Vomiting:  No  Double Vision:  No  Sleepiness:  No  Depression:  No  Slowed Cognitive Function:  No  Memory Problems:  No  Poor Balance:  No  Dizziness:  No  Appetite Changes:  No  Blurred Vision:  No  Decreased Libido:  No  Sleep Changes:  No  Behavioral Changes:  No  Skin: negative  Respiratory: No shortness of breath  Cardiovascular: negative  Have you experienced a traumatic fall since your last visit: NO  Are these falls related to your seizures: Not Applicable    Other Issues:    No other medical problems. MMood doing OK. Not suicidal.  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  Is patient safe to drive:  No    Exam:    There were no vitals taken for this visit.     Wt Readings from Last 5 Encounters:   04/03/17 226 lb (102.5 kg)   03/10/17 223 lb (101.2 kg)   12/06/16 230 lb (104.3 kg)   06/23/16 230 lb 6.4 oz (104.5 kg)   03/18/16 233 lb (105.7 kg)     PHYSICAL EXAMINATION:  Normal straightaway gait including tandem, Romberg, 1-foot stand.  Visual fields are full.  Extraocular movements  intact.  Smile symmetrical.  Facial sensation equal.  Tongue is midline and strong without signs laceration.  There is no drift, pronation or tremor.  Proximal and distal strength full in upper and lower extremities.  Finger-finger-nose and heel-knee-shin is done well. He very pleasant and cooperative young man who asks thoughtful questions and exhibits greater maturity than one might expect for his age.  Mental status exam is grossly intact and no obvious indications of depression or anxiety on examination.        IMPRESSION:    1.  Good story for localization-related epilepsy; simple to complex partial seizures, occasional secondary generalization.  Clinical features are consistent with temporal lobe onset or early temporal lobe propagation.  The clinical features suggest more a nondominant than a dominant temporal lobe onset.  EEG at [facility] with left temporal irritative zone EEG done at [facility] suggests right temporal irritative zone.  He could have bitemporal epilepsy.  Outside MRI normal; not clear this is a high quality study; if normal may have neocortical epilepsy. History not strongly suggestive of mesial temporal epilepsy.  2.  Refractory to phenytoin, levetiracetam, high dose lacosamide and now oxcarbazepine. Has done best with oxcarbazepine levetiracetam but clearly maximized and not seizure free.      DISCUSSION:   Had a positive result with CBD 100mg capsules tid both for fewer seizures and well being.   PLAN:    1.  Continue current levetiracetam and oxcarbazepine.  2. D/C CBD now  As supply is ending and then work with CBD dispensary to continue it.  3. We can write a strong letter for coverage once there is insurance coverage for a CBD product.         Alonzo Carvalho MD

## 2017-05-31 LAB — LEVETIRACETAM SERPL-MCNC: 32 UG/ML

## 2017-06-01 LAB — 10OH-CARBAZEPINE SERPL-MCNC: 23.6 UG/ML

## 2017-06-02 NOTE — PROGRESS NOTES
[facility]      Patient:  India Hylton  :  1990   Age:  26 year old   Today's Office Visit: 2017    History sincwe last visit:  He completed the trial of CBD with food and without food and 18 days of 100 tid. He had no seizures during the 18 days; usually would have 1 or 2 in this interval. Also feels mood was better.  Epilepsy Data:    Patient History  Primary Epileptologist/Provider: Ranjit Goddard M.D.  Patient Status: Not yet controlled  Epilepsy Syndrome: Localization-related epilepsy unspecified  Epilepsy Syndrome Status: Preliminary  Age of Onset: 22yo (24 yo)     Tests/Surgery History  Last EEG: 2016  Last MRI: 2014    Seizure Record  Current Visit Date: 17  Previous Visit Date: 16  Months since last visit: 3.09    Seizure Type 1: Simple partial onset followed by impairment of consciousness  Description of Sz Type 1: joselyn vu followed by stare, amnesia, automatisms.  # of Type 1 Seizure since last visit: 11  Freq. Type 1 / Month: 3.56    Seizure Type 2: Partial seizures with secondary generalization  - with simple partial seizures evolving to generalized seizures  Description of Sz Type 2: joselyn vu followed by collapse, amnesia, stiffening, jerking, often TB  # of Type 2 Seizure since last visit: 0  Freq. Type 2 / Month: 0    History of Present Illness:     No change in seizure characteristics. No convulsive seizures.  Complex partial seizures continue and he keeps a close record on his smart phone.  Numbers have come down from about 6 per month prior to oxcarbazepine to about 3 per month as we optimized carbamazepine.  Nonetheless continues with seizures with impairment.    Last visit suggested formal education regarding surgery. This was ordered but he did not arrange it. Reports that he feels he is doing well.  Reports he does not feel ready for surgery at this time.    Current Outpatient Prescriptions   Medication Sig Dispense Refill      levETIRAcetam (KEPPRA) 750 MG tablet Take two and one half tablets twice per day (total 3750 mg per day) 150 tablet 11     OXcarbazepine (TRILEPTAL) 300 MG tablet Inicrease as instructed until taking four in AM and four and one half in PM (total 2550 mg per day) (Patient taking differently: 4 tabs in the am and 4.5 tabs in the hs) 255 tablet 11        Medication Notes:    Taking OXC [phone] (2550/D)  AED Side Effects Discussed: Yes   AED Medication Compliance:  compliant all of the time  Using a pill box:  Yes    Review of Systems:  Lethargy / Tiredness:  No  Nausea / Vomiting:  No  Double Vision:  No  Sleepiness:  No  Depression:  No  Slowed Cognitive Function:  No  Memory Problems:  No  Poor Balance:  No  Dizziness:  No  Appetite Changes:  No  Blurred Vision:  No  Decreased Libido:  No  Sleep Changes:  No  Behavioral Changes:  No  Skin: negative  Respiratory: No shortness of breath  Cardiovascular: negative  Have you experienced a traumatic fall since your last visit: NO  Are these falls related to your seizures: Not Applicable    Other Issues:    No other medical problems. MMood doing OK. Not suicidal.  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 Takes green lilne to work.  Is patient safe to drive:  No    Exam:    There were no vitals taken for this visit.     Wt Readings from Last 5 Encounters:   04/03/17 226 lb (102.5 kg)   03/10/17 223 lb (101.2 kg)   12/06/16 230 lb (104.3 kg)   06/23/16 230 lb 6.4 oz (104.5 kg)   03/18/16 233 lb (105.7 kg)     PHYSICAL EXAMINATION:  Normal straightaway gait including tandem, Romberg, 1-foot stand.  Visual fields are full.  Extraocular movements intact.  Smile symmetrical.  Facial sensation equal.  Tongue is midline and strong without signs laceration.  There is no drift, pronation or tremor.  Proximal and distal strength full in upper and lower extremities.  Finger-finger-nose and heel-knee-shin is done well. He very  pleasant and cooperative young man who asks thoughtful questions and exhibits greater maturity than one might expect for his age.  Mental status exam is grossly intact and no obvious indications of depression or anxiety on examination.        IMPRESSION:    1.  Good story for localization-related epilepsy; simple to complex partial seizures, occasional secondary generalization.  Clinical features are consistent with temporal lobe onset or early temporal lobe propagation.  The clinical features suggest more a nondominant than a dominant temporal lobe onset.  EEG at [facility] with left temporal irritative zone EEG done at [facility] suggests right temporal irritative zone.  He could have bitemporal epilepsy.  Outside MRI normal; not clear this is a high quality study; if normal may have neocortical epilepsy. History not strongly suggestive of mesial temporal epilepsy.  2.  Refractory to phenytoin, levetiracetam, high dose lacosamide and now oxcarbazepine. Has done best with oxcarbazepine levetiracetam but clearly maximized and not seizure free.      DISCUSSION:   Had a positive result with CBD 100mg capsules tid both for fewer seizures and well being.   PLAN:    1.  Continue current levetiracetam and oxcarbazepine.  2. D/C CBD now  As supply is ending and then work with CBD dispensary to continue it.  3. We can write a strong letter for coverage once there is insurance coverage for a CBD product.         Alonzo Carvalho MD

## 2017-09-22 ENCOUNTER — OFFICE VISIT (OUTPATIENT)
Dept: NEUROLOGY | Facility: CLINIC | Age: 27
End: 2017-09-22

## 2017-09-22 VITALS
BODY MASS INDEX: 30.75 KG/M2 | WEIGHT: 227 LBS | HEART RATE: 62 BPM | HEIGHT: 72 IN | DIASTOLIC BLOOD PRESSURE: 74 MMHG | SYSTOLIC BLOOD PRESSURE: 131 MMHG

## 2017-09-22 DIAGNOSIS — G40.219 LOCALIZATION-RELATED EPILEPSY WITH COMPLEX PARTIAL SEIZURES WITH INTRACTABLE EPILEPSY (H): Primary | ICD-10-CM

## 2017-09-22 DIAGNOSIS — G40.219 LOCALIZATION-RELATED EPILEPSY WITH COMPLEX PARTIAL SEIZURES WITH INTRACTABLE EPILEPSY (H): ICD-10-CM

## 2017-09-22 LAB — SODIUM SERPL-SCNC: 133 MMOL/L (ref 133–144)

## 2017-09-22 ASSESSMENT — PAIN SCALES - GENERAL: PAINLEVEL: NO PAIN (0)

## 2017-09-22 NOTE — PATIENT INSTRUCTIONS
Times of Days  AM  PM       Medication Tablet Size Number of Tablets/Capsules Total Daily Dosage    NOW oxcarbazepine 300 mg  4    4 1/2      2550 mg                      week 1 oxcarbazepine 300 mg  4 /12 4 1/2      2700 mg                      week 2 oxcarbazepine 300 mg  4 1/2    5      2850 mg                      week 3 and after oxcarbazepine 300 mg  5    5      3000 mg                       Keep levetiracetam the same.    Increase oxcarbazepine some as above.  Side effects of too much oxcarbazepine are double vision, blurry vision, unsteadiness.    Get higher magnet strength MRI of brain.    Carry this with you at all times.  CONTINUE TAKING YOUR OTHER MEDICATIONS AS PREVIOUSLY DIRECTED.      * * *Do not store medications in the bathroom.  Keep medications away from children!* * *

## 2017-09-22 NOTE — LETTER
2017       RE: India Hylton  [address]  [facility] [address]     Dear Colleague,    Thank you for referring your patient, India Hylton, to the [facility] NEUROLOGY at [facility]. Please see a copy of my visit note below.    [facility] Epilepsy Care Progress Note      Patient:  India Hylton  :  1990   Age:  26 year old   Today's Office Visit:  2017    Epilepsy Data:    Patient History  Primary Epileptologist/Provider: Ranjit Goddard M.D.  Patient Status: Not yet controlled  Epilepsy Syndrome: Localization-related epilepsy unspecified  Epilepsy Syndrome Status: Preliminary  Age of Onset: 24yo (22 yo)     Tests/Surgery History  Last EEG: 2016  Last MRI: 2014    Seizure Record  Current Visit Date: 17  Previous Visit Date: 03/10/17  Months since last visit: 6.44    Seizure Type 1: Simple partial onset followed by impairment of consciousness  Description of Sz Type 1: joselyn vu followed by stare, amnesia, automatisms.  # of Type 1 Seizure since last visit: 26  Freq. Type 1 / Month: 4.04    Seizure Type 2: Partial seizures with secondary generalization  - with simple partial seizures evolving to generalized seizures  Description of Sz Type 2: joselyn vu followed by collapse, amnesia, stiffening, jerking, often TB  # of Type 2 Seizure since last visit: 0  Freq. Type 2 / Month: 0    History of Present Illness:     Continues with dejavu sensation prior to all seizures.  Also had two seizures with strong smell and preservation of memory but inability to type a text.  These two seizures were very brief. Seizures of this type had not occurred before.    No convulsions.    Involved in cannabis absorption study. Feels that cannabis helped; had only one seizure during that month. However cannot afford to take it long term.      Current Outpatient Prescriptions   Medication Sig Dispense Refill     levETIRAcetam (KEPPRA) 750 MG tablet Take two  "and one half tablets twice per day (total 3750 mg per day) 150 tablet 11     OXcarbazepine (TRILEPTAL) 300 MG tablet Inicrease as instructed until taking four in AM and four and one half in PM (total 2550 mg per day) (Patient taking differently: 4 tabs in the am and 4.5 tabs in the hs) 255 tablet 11        Medication Notes:  Taking  1200 in AM and 1350 in PM      AED Medication Compliance:  compliant all of the time  Using a pill box:  Yes    Review of Systems:  Lethargy / Tiredness:  No  Nausea / Vomiting:  No  Double Vision:  No  Sleepiness:  No  Depression:  No  Slowed Cognitive Function:  No  Memory Problems:  No  Poor Balance:  No  Dizziness:  No  Appetite Changes:  No  Blurred Vision:  No  Decreased Libido:  No  Sleep Changes:  No  Behavioral Changes:  No  Skin: negative  Respiratory: No shortness of breath, dyspnea on exertion, cough, or hemoptysis and No shortness of breath  Cardiovascular: negative  Have you experienced a traumatic fall since your last visit: NO  Are these falls related to your seizures: Not Applicable    Other Issues:    No other health troubles. Mood OK. Not suicidal  Is patient safe to drive:  No    Exam:    /74 (BP Location: Left arm, Patient Position: Sitting, Cuff Size: Adult Large)  Pulse 62  Ht 1.831 m (6' 0.1\")  Wt 103 kg (227 lb)  BMI 30.7 kg/m2     Wt Readings from Last 5 Encounters:   09/22/17 103 kg (227 lb)   04/03/17 102.5 kg (226 lb)   03/10/17 101.2 kg (223 lb)   12/06/16 104.3 kg (230 lb)   06/23/16 104.5 kg (230 lb 6.4 oz)     PHYSICAL EXAMINATION:  Normal straightaway gait including tandem, Romberg, 1-foot stand.  Visual fields are full.  Extraocular movements intact.  Smile symmetrical.  Facial sensation equal.  Tongue is midline and strong without signs laceration.  There is no drift, pronation or tremor.  Proximal and distal strength full in upper and lower extremities.  Finger-finger-nose and heel-knee-shin is done well. Mental status exam is grossly " intact and no obvious indications of depression or anxiety on examination.      Results for GURPREET HOLLY (MRN [id number]) as of 9/22/2017 17:26   Ref. Range 3/10/2017 15:47 4/24/2017 10:29 5/30/2017 10:00   Keppra (Levetiracetam) Level Unknown   32   Levetiracetam Level Unknown 25.2 47.8 (H)    10 Hydroxy Metabolite Level Unknown 28.7 31.4 23.6       IMPRESSION:    1.  Good story for localization-related epilepsy; simple to complex partial seizures, occasional secondary generalization.  Clinical features are consistent with temporal lobe onset or early temporal lobe propagation. The clinical features of most seizures suggest more a nondominant than a dominant temporal lobe onset. 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  EEG  at [facility] with left temporal irritative zone EEG done at [facility] suggests right temporal irritative zone.   This also supports bitemporal epilepsy.  Outside MRI normal; not clear this is a high quality study; if normal may have neocortical epilepsy. History not strongly suggestive of mesial temporal epilepsy.  2.  Refractory to phenytoin, levetiracetam, high dose lacosamide and probably oxcarbazepine tho levels not high therapeutic.       DISCUSSION:  Again extensive discussion about treatment options. He medically refractory and further AED treatment not likely to help. On the other hand probably does not have mesiobasal epilepsy and would require grids tho if has unilateral temporal neocortical epilepsy prognosis for cure may be reasonable. We again encouraged presurgical evaluation starting with 3T epilepsy directed MRI. He was open to 3 T MRI but continued unexcited about surgery. We spoke about further AED interventions emphasizing that it wasn't likely that these would result in cure or even significant improvement. He was not enthusiastic about these either.      PLAN:     1.  Continue current levetiracetam. Increase oxcarbazepine gradually y 450 mg over three weeks. We discussed signs of OXC toxicity.  2. MRI of brain 3T. Seizure protocol.  3. RTC 4 mon ths. If MRI with epileptogenic lesion we can have further discussion regarding surgery. If negative, consider moving to lamotrigine with levetiracetam next.          Again, thank you for allowing me to participate in the care of your patient.      Sincerely,    Ranjit Goddard MD

## 2017-09-22 NOTE — MR AVS SNAPSHOT
After Visit Summary   9/22/2017    India Hylton    MRN: [id number]           Patient Information     Date Of Birth          1990        Visit Information        Provider Department      9/22/2017 2:45 PM Ranjit Goddard MD [facility] Neurology        Today's Diagnoses     Localization-related epilepsy with complex partial seizures with intractable epilepsy (H)    -  1      Care Instructions      Times of Days  AM  PM       Medication Tablet Size Number of Tablets/Capsules Total Daily Dosage    NOW oxcarbazepine 300 mg  4    4 1/2      2550 mg                      week 1 oxcarbazepine 300 mg  4 /12    4 1/2      2700 mg                      week 2 oxcarbazepine 300 mg  4 1/2    5      2850 mg                      week 3 and after oxcarbazepine 300 mg  5    5      3000 mg                       Keep levetiracetam the same.    Increase oxcarbazepine some as above.  Side effects of too much oxcarbazepine are double vision, blurry vision, unsteadiness.    Get higher magnet strength MRI of brain.    Carry this with you at all times.  CONTINUE TAKING YOUR OTHER MEDICATIONS AS PREVIOUSLY DIRECTED.      * * *Do not store medications in the bathroom.  Keep medications away from children!* * *             Follow-ups after your visit        Follow-up notes from your care team     Return in about 4 months (around 1/22/2018).      Your next 10 appointments already scheduled     Sep 22, 2017  3:30 PM CDT   LAB with  LAB    Health Lab ([facility])    [address] [place] [id number] [phone]           Patient must bring picture ID. Patient should be prepared to give a urine specimen  Please do not eat 10-12 hours before your appointment if you are coming in fasting for labs on lipids, cholesterol, or glucose (sugar). Pregnant women should follow their Care Team instructions. Water with medications is okay. Do not drink coffee or other  fluids. If you have concerns about taking  your medications, please ask at office or if scheduling via [employer], send a message by clicking on Secure Messaging, Message Your Care Team.            Sep 27, 2017  7:45 PM CDT   (Arrive by 7:30 PM)   MR BRAIN W/O & W CONTRAST with [address] MRI ([facility] [place])    [address] [address] [phone]           Take your medicines as usual, unless your doctor tells you not to. Bring a list of your current medicines to your exam (including vitamins, minerals and over-the-counter drugs).  You will be given intravenous contrast for this exam. To prepare:   The day before your exam, drink extra fluids at least six 8-ounce glasses (unless your doctor tells you to restrict your fluids).   Have a blood test (creatinine test) within 30 days of your exam. Go to your clinic or Diagnostic Imaging Department for this test.  The MRI machine uses a strong magnet. Please wear clothes without metal (snaps, zippers). A sweatsuit works well, or we may give you a hospital gown.  Please remove any body piercings and hair extensions before you arrive. You will also remove watches, jewelry, hairpins, wallets, dentures, partial dental plates and hearing aids. You may wear contact lenses, and you may be able to wear your rings. We have a safe place to keep your personal items, but it is safer to leave them at home.   **IMPORTANT** THE INSTRUCTIONS BELOW ARE ONLY FOR THOSE PATIENTS WHO HAVE BEEN TOLD THEY WILL RECEIVE SEDATION OR GENERAL ANESTHESIA DURING THEIR MRI PROCEDURE:  IF YOU WILL RECEIVE SEDATION (take medicine to help you relax during your exam):   You must get the medicine from your doctor before you arrive. Bring the medicine to the exam. Do not take it at home.   Arrive one hour early. Bring someone who can take you home after the test. Your medicine will make you sleepy. After the exam, you may not drive,  take a bus or take a taxi by yourself.   No eating 8 hours before your exam. You may have clear liquids up until 4 hours before your exam. (Clear liquids include water, clear tea, black coffee and fruit juice without pulp.)  IF YOU WILL RECEIVE ANESTHESIA (be asleep for your exam):   Arrive 1 1/2 hours early. Bring someone who can take you home after the test. You may not drive, take a bus or take a taxi by yourself.   No eating 8 hours before your exam. You may have clear liquids up until 4 hours before your exam. (Clear liquids include water, clear tea, black coffee and fruit juice without pulp.)  Please call the Imaging Department at your exam site with any questions.            Jan 26, 2018  3:15 PM CST   (Arrive by 3:00 PM)   Return Seizure with Ranjit Goddard MD   [facility] Neurology ([facility])    [address] [place] [id number] [phone]              Future tests that were ordered for you today     Open Future Orders        Priority Expected Expires Ordered    MRI Brain w & w/o contrast Routine  9/22/2018 9/22/2017    Sodium Routine  9/23/2018 9/22/2017            Who to contact     Please call your clinic at [phone] to:    Ask questions about your health    Make or cancel appointments    Discuss your medicines    Learn about your test results    Speak to your doctor   If you have compliments or concerns about an experience at your clinic, or if you wish to file a complaint, please contact [facility] Patient Relations at [phone] or email us at [email or web address]         Additional Information About Your Visit        [employer] Information     [employer] is an electronic gateway that provides easy, online access to your medical records. With [employer], you can request a clinic appointment, read your test results, renew a prescription or communicate with your care team.     To sign up for [employer] visit  "the website at [email or web address]   You will be asked to enter the access code listed below, as well as some personal information. Please follow the directions to create your username and password.     Your access code is: [email or web address]  Expires: 2017  6:30 AM     Your access code will  in 90 days. If you need help or a new code, please contact your [facility] or call [phone] for assistance.        Care EveryWhere ID     This is your Care EveryWhere ID. This could be used by other organizations to access your [facility] medical records  [id number]        Your Vitals Were     Pulse Height BMI (Body Mass Index)             62 1.831 m (6' 0.1\") 30.7 kg/m2          Blood Pressure from Last 3 Encounters:   17 131/74   17 151/87   03/10/17 135/60    Weight from Last 3 Encounters:   17 103 kg (227 lb)   17 102.5 kg (226 lb)   03/10/17 101.2 kg (223 lb)              We Performed the Following     Keppra (Levetiracetam) Level     Oxcarbazepine level          Today's Medication Changes          These changes are accurate as of: 17  3:27 PM.  If you have any questions, ask your nurse or doctor.               These medicines have changed or have updated prescriptions.        Dose/Directions    OXcarbazepine 300 MG tablet   Commonly known as:  TRILEPTAL   This may have changed:  additional instructions   Used for:  Localization-related (focal) (partial) symptomatic epilepsy and epileptic syndromes with complex partial seizures, not intractable, without status epilepticus (H)        Inicrease as instructed until taking four in AM and four and one half in PM (total 2550 mg per day)   Quantity:  255 tablet   Refills:  11                Primary Care Provider    Physician No Ref-Primary       No address on file        Equal Access to Services     RAGHAVENDRA CAZARES AH: Caridad Hilton, desmond mccurdy, qachilangota kaalmada adeashleyyada, meseret angel " ramón gamboahunterbryn wright ah. So [facility] [phone].    ATENCIÓN: Si anatolyla rama, tiene a steel disposición servicios gratuitos de asistencia lingüística. Bala ulrich [phone].    We comply with applicable federal civil rights laws and [place] laws. We do not discriminate on the basis of race, color, national origin, age, disability sex, sexual orientation or gender identity.            Thank you!     Thank you for choosing [facility] NEUROLOGY  for your care. Our goal is always to provide you with excellent care. Hearing back from our patients is one way we can continue to improve our services. Please take a few minutes to complete the written survey that you may receive in the mail after your visit with us. Thank you!             Your Updated Medication List - Protect others around you: Learn how to safely use, store and throw away your medicines at www.disposemymeds.org.          This list is accurate as of: 9/22/17  3:27 PM.  Always use your most recent med list.                   Brand Name Dispense Instructions for use Diagnosis    levETIRAcetam 750 MG tablet    KEPPRA    150 tablet    Take two and one half tablets twice per day (total 3750 mg per day)    Localz-rltd symptomatic epilepsy w cmplx part sz, notintrac, wo status (H)       OXcarbazepine 300 MG tablet    TRILEPTAL    255 tablet    Inicrease as instructed until taking four in AM and four and one half in PM (total 2550 mg per day)    Localization-related (focal) (partial) symptomatic epilepsy and epileptic syndromes with complex partial seizures, not intractable, without status epilepticus (H)

## 2017-09-22 NOTE — PROGRESS NOTES
[facility] Epilepsy Care Progress Note      Patient:  India Hylton  :  1990   Age:  26 year old   Today's Office Visit:  2017    Epilepsy Data:    Patient History  Primary Epileptologist/Provider: Ranjit Goddard M.D.  Patient Status: Not yet controlled  Epilepsy Syndrome: Localization-related epilepsy unspecified  Epilepsy Syndrome Status: Preliminary  Age of Onset: 24yo (24 yo)     Tests/Surgery History  Last EEG: 2016  Last MRI: 2014    Seizure Record  Current Visit Date: 17  Previous Visit Date: 03/10/17  Months since last visit: 6.44    Seizure Type 1: Simple partial onset followed by impairment of consciousness  Description of Sz Type 1: joselyn vu followed by stare, amnesia, automatisms.  # of Type 1 Seizure since last visit: 26  Freq. Type 1 / Month: 4.04    Seizure Type 2: Partial seizures with secondary generalization  - with simple partial seizures evolving to generalized seizures  Description of Sz Type 2: joselyn vu followed by collapse, amnesia, stiffening, jerking, often TB  # of Type 2 Seizure since last visit: 0  Freq. Type 2 / Month: 0    History of Present Illness:     Continues with dejavu sensation prior to all seizures.  Also had two seizures with strong smell and preservation of memory but inability to type a text.  These two seizures were very brief. Seizures of this type had not occurred before.    No convulsions.    Involved in cannabis absorption study. Feels that cannabis helped; had only one seizure during that month. However cannot afford to take it long term.      Current Outpatient Prescriptions   Medication Sig Dispense Refill     levETIRAcetam (KEPPRA) 750 MG tablet Take two and one half tablets twice per day (total 3750 mg per day) 150 tablet 11     OXcarbazepine (TRILEPTAL) 300 MG tablet Inicrease as instructed until taking four in AM and four and one half in PM (total 2550 mg per day) (Patient taking differently: 4 tabs in the am and 4.5 tabs in  "the hs) 255 tablet 11        Medication Notes:  Taking  1200 in AM and 1350 in PM      AED Medication Compliance:  compliant all of the time  Using a pill box:  Yes    Review of Systems:  Lethargy / Tiredness:  No  Nausea / Vomiting:  No  Double Vision:  No  Sleepiness:  No  Depression:  No  Slowed Cognitive Function:  No  Memory Problems:  No  Poor Balance:  No  Dizziness:  No  Appetite Changes:  No  Blurred Vision:  No  Decreased Libido:  No  Sleep Changes:  No  Behavioral Changes:  No  Skin: negative  Respiratory: No shortness of breath, dyspnea on exertion, cough, or hemoptysis and No shortness of breath  Cardiovascular: negative  Have you experienced a traumatic fall since your last visit: NO  Are these falls related to your seizures: Not Applicable    Other Issues:    No other health troubles. Mood OK. Not suicidal  Is patient safe to drive:  No    Exam:    /74 (BP Location: Left arm, Patient Position: Sitting, Cuff Size: Adult Large)  Pulse 62  Ht 1.831 m (6' 0.1\")  Wt 103 kg (227 lb)  BMI 30.7 kg/m2     Wt Readings from Last 5 Encounters:   09/22/17 103 kg (227 lb)   04/03/17 102.5 kg (226 lb)   03/10/17 101.2 kg (223 lb)   12/06/16 104.3 kg (230 lb)   06/23/16 104.5 kg (230 lb 6.4 oz)     PHYSICAL EXAMINATION:  Normal straightaway gait including tandem, Romberg, 1-foot stand.  Visual fields are full.  Extraocular movements intact.  Smile symmetrical.  Facial sensation equal.  Tongue is midline and strong without signs laceration.  There is no drift, pronation or tremor.  Proximal and distal strength full in upper and lower extremities.  Finger-finger-nose and heel-knee-shin is done well. Mental status exam is grossly intact and no obvious indications of depression or anxiety on examination.      Results for GURPREET HOLLY (MRN [id number]) as of 9/22/2017 17:26   Ref. Range 3/10/2017 15:47 4/24/2017 10:29 5/30/2017 10:00   Keppra (Levetiracetam) Level Unknown   32   Levetiracetam Level " Unknown 25.2 47.8 (H)    10 Hydroxy Metabolite Level Unknown 28.7 31.4 23.6       IMPRESSION:    1.  Good story for localization-related epilepsy; simple to complex partial seizures, occasional secondary generalization.  Clinical features are consistent with temporal lobe onset or early temporal lobe propagation. The clinical features of most seizures suggest more a nondominant than a dominant temporal lobe onset. 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  EEG at [facility] with left temporal irritative zone EEG done at [facility] suggests right temporal irritative zone.  This also supports bitemporal epilepsy.  Outside MRI normal; not clear this is a high quality study; if normal may have neocortical epilepsy. History not strongly suggestive of mesial temporal epilepsy.  2.  Refractory to phenytoin, levetiracetam, high dose lacosamide and probably oxcarbazepine tho levels not high therapeutic.       DISCUSSION:  Again extensive discussion about treatment options. He medically refractory and further AED treatment not likely to help. On the other hand probably does not have mesiobasal epilepsy and would require grids tho if has unilateral temporal neocortical epilepsy prognosis for cure may be reasonable. We again encouraged presurgical evaluation starting with 3T epilepsy directed MRI. He was open to 3 T MRI but continued unexcited about surgery. We spoke about further AED interventions emphasizing that it wasn't likely that these would result in cure or even significant improvement. He was not enthusiastic about these either.      PLAN:    1.  Continue current levetiracetam. Increase oxcarbazepine gradually y 450 mg over three weeks. We discussed signs of OXC toxicity.  2. MRI of brain 3T. Seizure protocol.  3. RTC 4 months. If MRI with epileptogenic lesion we can have further discussion regarding surgery. If  negative, consider moving to lamotrigine with levetiracetam next.      KARIS FRANK MD

## 2017-09-23 LAB
10OH-CARBAZEPINE SERPL-MCNC: 27.2 UG/ML
LEVETIRACETAM SERPL-MCNC: 21 UG/ML (ref 12–46)

## 2017-10-05 ENCOUNTER — HOSPITAL ENCOUNTER (OUTPATIENT)
Dept: MRI IMAGING | Facility: CLINIC | Age: 27
Discharge: HOME | End: 2017-10-05
Attending: PSYCHIATRY & NEUROLOGY | Admitting: PSYCHIATRY & NEUROLOGY
Payer: COMMERCIAL

## 2017-10-05 DIAGNOSIS — G40.219 LOCALIZATION-RELATED EPILEPSY WITH COMPLEX PARTIAL SEIZURES WITH INTRACTABLE EPILEPSY (H): ICD-10-CM

## 2017-10-05 PROCEDURE — 25000128 H RX IP 250 OP 636: Performed by: PSYCHIATRY & NEUROLOGY

## 2017-10-05 PROCEDURE — A9585 GADOBUTROL INJECTION: HCPCS | Performed by: PSYCHIATRY & NEUROLOGY

## 2017-10-05 PROCEDURE — 70553 MRI BRAIN STEM W/O & W/DYE: CPT

## 2017-10-05 RX ORDER — GADOBUTROL 604.72 MG/ML
10 INJECTION INTRAVENOUS ONCE
Status: COMPLETED | OUTPATIENT
Start: 2017-10-05 | End: 2017-10-05

## 2017-10-05 RX ADMIN — GADOBUTROL 10 ML: 604.72 INJECTION INTRAVENOUS at 16:57

## 2018-01-26 ENCOUNTER — OFFICE VISIT (OUTPATIENT)
Dept: NEUROLOGY | Facility: CLINIC | Age: 28
End: 2018-01-26
Payer: COMMERCIAL

## 2018-01-26 VITALS
HEART RATE: 62 BPM | DIASTOLIC BLOOD PRESSURE: 78 MMHG | SYSTOLIC BLOOD PRESSURE: 133 MMHG | HEIGHT: 72 IN | WEIGHT: 231.8 LBS | BODY MASS INDEX: 31.4 KG/M2

## 2018-01-26 DIAGNOSIS — G40.119 TEMPORAL LOBE EPILEPSY, INTRACTABLE (H): Primary | ICD-10-CM

## 2018-01-26 DIAGNOSIS — G40.209 LOCALZ-RLTD SYMPTOMATIC EPILEPSY W CMPLX PART SZ, NOTINTRAC, WO STATUS (H): ICD-10-CM

## 2018-01-26 RX ORDER — LEVETIRACETAM 750 MG/1
TABLET ORAL
Qty: 150 TABLET | Refills: 11 | Status: SHIPPED | OUTPATIENT
Start: 2018-01-26 | End: 2019-04-15

## 2018-01-26 RX ORDER — OXCARBAZEPINE 300 MG/1
TABLET, FILM COATED ORAL
Qty: 300 TABLET | Refills: 11 | Status: SHIPPED | OUTPATIENT
Start: 2018-01-26 | End: 2018-12-04

## 2018-01-26 ASSESSMENT — PAIN SCALES - GENERAL: PAINLEVEL: NO PAIN (0)

## 2018-01-26 NOTE — MR AVS SNAPSHOT
After Visit Summary   1/26/2018    India Hylton    MRN: [id number]           Patient Information     Date Of Birth          1990        Visit Information        Provider Department      1/26/2018 3:15 PM Ranjit Goddard MD [facility]        Today's Diagnoses     Temporal lobe epilepsy, intractable (H)    -  1    Localz-rltd symptomatic epilepsy w cmplx part sz, notintrac, wo status (H)          Care Instructions    Next medications to try would be lamotrigine (lamictal) or lacosamide (vimpat).    The new brain stimulator is made by [employer].    A good patient created website is called [employer]; it is put together by an  who had the device implanted.          Follow-ups after your visit        Follow-up notes from your care team     Return in about 4 months (around 5/26/2018).      Your next 10 appointments already scheduled     May 25, 2018  4:15 PM CDT   (Arrive by 4:00 PM)   Return Seizure with Ranjit Goddard MD   [facility] ([facility])    [address] [place], [place] [id number] [phone]              Who to contact     Please call your clinic at [phone] to:    Ask questions about your health    Make or cancel appointments    Discuss your medicines    Learn about your test results    Speak to your doctor   If you have compliments or concerns about an experience at your clinic, or if you wish to file a complaint, please contact [facility] Patient Relations at [phone] or email us at [email or web address]         Additional Information About Your Visit        Synthorxhart Information     Tixers is an electronic gateway that provides easy, online access to your medical records. With Tixers, you can request a clinic appointment, read your test results, renew a prescription or communicate with your care team.     To sign up for Tixers visit the  website at [email or web address]   You will be asked to enter the access code listed below, as well as some personal information. Please follow the directions to create your username and password.     Your access code is: [email or web address]  Expires: 2018  6:30 AM     Your access code will  in 90 days. If you need help or a new code, please contact your [facility] or call [phone] for assistance.        Care EveryWhere ID     This is your Care EveryWhere ID. This could be used by other organizations to access your [facility] medical records  [id number]        Your Vitals Were     Pulse Height BMI (Body Mass Index)             62 1.829 m (6') 31.44 kg/m2          Blood Pressure from Last 3 Encounters:   18 133/78   17 131/74   17 151/87    Weight from Last 3 Encounters:   18 105.1 kg (231 lb 12.8 oz)   17 103 kg (227 lb)   17 102.5 kg (226 lb)              Today, you had the following     No orders found for display         Today's Medication Changes          These changes are accurate as of 18  3:53 PM.  If you have any questions, ask your nurse or doctor.               These medicines have changed or have updated prescriptions.        Dose/Directions    OXcarbazepine 300 MG tablet   Commonly known as:  TRILEPTAL   This may have changed:  additional instructions   Used for:  Temporal lobe epilepsy, intractable (H)   Changed by:  Ranjit Goddard MD        Take five tablets twice daily ([id number] mg per day)   Quantity:  300 tablet   Refills:  11            Where to get your medicines      These medications were sent to [facility] [address] [place] [address] AT [address] [address]    Hours:  24-hours Phone:  [phone]     levETIRAcetam 750 MG tablet    OXcarbazepine 300 MG tablet                Primary Care Provider Fax #    Physician No Ref-Primary [phone]        No address on file        Equal Access to Services     [facility] : Hadii selwyn root israel Hilton, waortizda luqadaha, qaybta kaalmanuel daphniekendrarogelio, waxlynn stanley gamboahunterbryn puga. So [facility] [phone].    ATENCIÓN: Si habla español, tiene a steel disposición servicios gratuitos de asistencia lingüística. Llame al [phone].    We comply with applicable federal civil rights laws and [place] laws. We do not discriminate on the basis of race, color, national origin, age, disability, sex, sexual orientation, or gender identity.            Thank you!     Thank you for choosing [facility]UR[facility]  for your care. Our goal is always to provide you with excellent care. Hearing back from our patients is one way we can continue to improve our services. Please take a few minutes to complete the written survey that you may receive in the mail after your visit with us. Thank you!             Your Updated Medication List - Protect others around you: Learn how to safely use, store and throw away your medicines at www.disposemymeds.org.          This list is accurate as of 1/26/18  3:53 PM.  Always use your most recent med list.                   Brand Name Dispense Instructions for use Diagnosis    levETIRAcetam 750 MG tablet    KEPPRA    150 tablet    Take two and one half tablets twice per day (total 3750 mg per day)    Localz-rltd symptomatic epilepsy w cmplx part sz, notintrac, wo status (H)       OXcarbazepine 300 MG tablet    TRILEPTAL    300 tablet    Take five tablets twice daily ([id number] mg per day)    Temporal lobe epilepsy, intractable (H)

## 2018-01-26 NOTE — PATIENT INSTRUCTIONS
Next medications to try would be lamotrigine (lamictal) or lacosamide (vimpat).    The new brain stimulator is made by [employer].    A good patient created website is called [email or web address]; it is put together by an  who had the device implanted.

## 2018-01-26 NOTE — LETTER
2018       RE: India Hylton  [address]  [place] [address]     Dear Colleague,    Thank you for referring your patient, India Hylton, to the [facility] at [facility]. Please see a copy of my visit note below.    [facility] Epilepsy Care Progress Note      Patient:  India Hylton  :  1990   Age:  27 year old   Today's Office Visit:  2018    Epilepsy Data:    Patient History  Primary Epileptologist/Provider: Ranjit Goddard M.D.  Patient Status: Not yet controlled  Epilepsy Syndrome: Localization-related epilepsy unspecified  Epilepsy Syndrome Status: Preliminary  Age of Onset: 24yo (24 yo)     Tests/Surgery History  Last EEG: 2016  Last MRI: 2014    Seizure Record  Current Visit Date: 18  Previous Visit Date: 17  Months since last visit: 4.14    Seizure Type 1: Simple partial onset followed by impairment of consciousness  Description of Sz Type 1: joselyn vu followed by stare, amnesia, automatisms.  # of Type 1 Seizure since last visit: 14  Freq. Type 1 / Month: 3.38    Seizure Type 2: Partial seizures with secondary generalization  - with simple partial seizures evolving to generalized seizures  Description of Sz Type 2: joselyn vu followed by collapse, amnesia, stiffening, jerking, often TB  # of Type 2 Seizure since last visit: 0  Freq. Type 2 / Month: 0    History of Present Illness:      Continues keeping accurate seizure diary with smart phone hermes.  Seizures a little less frequent. 3.4/month opposed to 4/month in prior 6 month interval.  Still gets joselyn vu sensation prior to seizures. Following one seizure he had intermittent unusual taste for about 6 hours.      Current Outpatient Prescriptions   Medication Sig Dispense Refill     levETIRAcetam (KEPPRA) 750 MG tablet Take two and one half tablets twice per day (total 3750 mg per day) 150 tablet 11     OXcarbazepine (TRILEPTAL) 300 MG tablet Inicrease as  instructed until taking four in AM and four and one half in PM (total 2550 mg per day) (Patient taking differently: 4 tabs in the am and 4.5 tabs in the hs) 255 tablet 11        Medication Notes:  Increased OXC to 1500 mg twice a day as instructed      AED Medication Compliance:  compliant all of the time  Using a pill box:  Yes    Review of Systems:  Denies headaches. Denies loss of vision. Denies double vision. Denies dysphagia. Denies cough, wheezing, hemoptysis. Denies chest pain, leg swelling. Denies significant weight change, abdominal pain, nausea, vomiting, change in bowel habits, blood per rectum. Denies dysuria, hematuria, flank pain, or kidney stones. Denies falls or fractures.  Have you experienced a traumatic fall since your last visit: NO  Are these falls related to your seizures: NO    Other Issues:    No falls. Mood doing OK. PHQ 2 today = 0.  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 Getting a little restive because doesn't see a clear path for promotion. On the other hand likes the benefits.  Is patient safe to drive:  No    Exam:    /78 (BP Location: Right arm, Patient Position: Chair, Cuff Size: Adult Regular)  Pulse 62  Ht 1.829 m (6')  Wt 105.1 kg (231 lb 12.8 oz)  BMI 31.44 kg/m2     Wt Readings from Last 5 Encounters:   01/26/18 105.1 kg (231 lb 12.8 oz)   09/22/17 103 kg (227 lb)   04/03/17 102.5 kg (226 lb)   03/10/17 101.2 kg (223 lb)   12/06/16 104.3 kg (230 lb)     PHYSICAL EXAMINATION:  Normal straightaway gait including tandem, Romberg, 1-foot stand.  Visual fields are full.  Extraocular movements intact.  Smile symmetrical.  Facial sensation equal.  Tongue is midline and strong without signs laceration.  There is no drift, pronation or tremor.  Proximal and distal strength full in upper and lower extremities.  Finger-finger-nose and heel-knee-shin is done well. Mental status exam is grossly intact and no obvious indications of depression or anxiety on  examination.     Results for GURPREET HOLLY (MRN [id number]) as of 1/26/2018 16:30   Ref. Range 5/30/2017 10:00 9/22/2017 15:45   Keppra (Levetiracetam) Level Latest Ref Range: 12 - 46 ug/mL 32 21   10 Hydroxy Metabolite Level Latest Ref Range: 10.0 - 35.0 ug/ml 23.6 27.2     Results for GURPREET HOLLY (MRN [id number]) as of 1/26/2018 16:30   Ref. Range 4/24/2017 10:29 9/22/2017 15:46   Sodium Latest Ref Range: 133 - 144 mmol/L 140 133     3 T MRI reviewed. Normal hippocampal bulk. Left medial temporal lobe a little brighter on FLAIR than right; multiple cuts; probably a real finding. No other abnormalities.     IMPRESSION:    1.  Good story for localization-related epilepsy; simple to complex partial seizures, occasional secondary generalization.  Clinical features are consistent with temporal lobe onset or early temporal lobe propagation. The clinical features of most seizures suggest more a nondominant than a dominant temporal lobe onset. 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  EEG at [facility] with left temporal irritative zone EEG done at [facility] suggests right temporal irritative zone.  This also supports bitemporal epilepsy.  3 T JANE with minor left temporal findings. History not strongly suggestive of mesial temporal epilepsy. Overall would need intracranial recording if we were to proceed with resective surgery.  2.  Refractory to phenytoin, levetiracetam, high dose lacosamide and probably oxcarbazepine tho levels not high therapeutic.       DISCUSSION:  Again extensive discussion about treatment options. He medically refractory and further AED treatment not likely to help. On the other hand probably does not have mesiobasal epilepsy and would require grids tho if has unilateral temporal neocortical epilepsy prognosis for cure may be reasonable. He continues unexcited about intracranial  recording. Feels current situation is fine and does not want to proceed with intracranial recording or or even try other AEDs at this time.      PLAN:    1.  Continue current levetiracetam and oxcarbazepine. Refiilled for one year.  2. RTC 6 months.   3. Consider moving to lamotrigine with levetiracetam next if wants AED changes. Provided with references for RNS in case he is interested.      Again, thank you for allowing me to participate in the care of your patient.      Sincerely,    Ranjit Goddard MD

## 2018-01-26 NOTE — PROGRESS NOTES
[facility] Progress Note      Patient:  India Hylton  :  1990   Age:  27 year old   Today's Office Visit:  2018    Epilepsy Data:    Patient History  Primary Epileptologist/Provider: Ranjit Goddard M.D.  Patient Status: Not yet controlled  Epilepsy Syndrome: Localization-related epilepsy unspecified  Epilepsy Syndrome Status: Preliminary  Age of Onset: 22yo (24 yo)     Tests/Surgery History  Last EEG: 2016  Last MRI: 2014    Seizure Record  Current Visit Date: 18  Previous Visit Date: 17  Months since last visit: 4.14    Seizure Type 1: Simple partial onset followed by impairment of consciousness  Description of Sz Type 1: joselyn vu followed by stare, amnesia, automatisms.  # of Type 1 Seizure since last visit: 14  Freq. Type 1 / Month: 3.38    Seizure Type 2: Partial seizures with secondary generalization  - with simple partial seizures evolving to generalized seizures  Description of Sz Type 2: joselyn vu followed by collapse, amnesia, stiffening, jerking, often TB  # of Type 2 Seizure since last visit: 0  Freq. Type 2 / Month: 0    History of Present Illness:      Continues keeping accurate seizure diary with smart phone hermes.  Seizures a little less frequent. 3.4/month opposed to 4/month in prior 6 month interval.  Still gets joselyn vu sensation prior to seizures. Following one seizure he had intermittent unusual taste for about 6 hours.      Current Outpatient Prescriptions   Medication Sig Dispense Refill     levETIRAcetam (KEPPRA) 750 MG tablet Take two and one half tablets twice per day (total 3750 mg per day) 150 tablet 11     OXcarbazepine (TRILEPTAL) 300 MG tablet Inicrease as instructed until taking four in AM and four and one half in PM (total 2550 mg per day) (Patient taking differently: 4 tabs in the am and 4.5 tabs in the hs) 255 tablet 11        Medication Notes:  Increased OXC to 1500 mg twice a day as instructed      AED Medication Compliance:   compliant all of the time  Using a pill box:  Yes    Review of Systems:  Denies headaches. Denies loss of vision. Denies double vision. Denies dysphagia. Denies cough, wheezing, hemoptysis. Denies chest pain, leg swelling. Denies significant weight change, abdominal pain, nausea, vomiting, change in bowel habits, blood per rectum. Denies dysuria, hematuria, flank pain, or kidney stones. Denies falls or fractures.  Have you experienced a traumatic fall since your last visit: NO  Are these falls related to your seizures: NO    Other Issues:    No falls. Mood doing OK. PHQ 2 today = 0.  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 On the other hand likes the benefits.  Is patient safe to drive:  No    Exam:    /78 (BP Location: Right arm, Patient Position: Chair, Cuff Size: Adult Regular)  Pulse 62  Ht 1.829 m (6')  Wt 105.1 kg (231 lb 12.8 oz)  BMI 31.44 kg/m2     Wt Readings from Last 5 Encounters:   01/26/18 105.1 kg (231 lb 12.8 oz)   09/22/17 103 kg (227 lb)   04/03/17 102.5 kg (226 lb)   03/10/17 101.2 kg (223 lb)   12/06/16 104.3 kg (230 lb)     PHYSICAL EXAMINATION:  Normal straightaway gait including tandem, Romberg, 1-foot stand.  Visual fields are full.  Extraocular movements intact.  Smile symmetrical.  Facial sensation equal.  Tongue is midline and strong without signs laceration.  There is no drift, pronation or tremor.  Proximal and distal strength full in upper and lower extremities.  Finger-finger-nose and heel-knee-shin is done well. Mental status exam is grossly intact and no obvious indications of depression or anxiety on examination.     Results for GURPREET HOLLY (MRN [id number]) as of 1/26/2018 16:30   Ref. Range 5/30/2017 10:00 9/22/2017 15:45   Keppra (Levetiracetam) Level Latest Ref Range: 12 - 46 ug/mL 32 21   10 Hydroxy Metabolite Level Latest Ref Range: 10.0 - 35.0 ug/ml 23.6 27.2      Results for GURPREET HOLLY (MRN [id number]) as of 1/26/2018 16:30   Ref. Range 4/24/2017 10:29 9/22/2017 15:46   Sodium Latest Ref Range: 133 - 144 mmol/L 140 133     3 T MRI reviewed. Normal hippocampal bulk. Left medial temporal lobe a little brighter on FLAIR than right; multiple cuts; probably a real finding. No other abnormalities.     IMPRESSION:    1.  Good story for localization-related epilepsy; simple to complex partial seizures, occasional secondary generalization.  Clinical features are consistent with temporal lobe onset or early temporal lobe propagation. The clinical features of most seizures suggest more a nondominant than a dominant temporal lobe onset. 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  EEG at [facility] with left temporal irritative zone EEG done at [facility] suggests right temporal irritative zone.  This also supports bitemporal epilepsy.  3 T JANE with minor left temporal findings. History not strongly suggestive of mesial temporal epilepsy. Overall would need intracranial recording if we were to proceed with resective surgery.  2.  Refractory to phenytoin, levetiracetam, high dose lacosamide and probably oxcarbazepine tho levels not high therapeutic.       DISCUSSION:  Again extensive discussion about treatment options. He medically refractory and further AED treatment not likely to help. On the other hand probably does not have mesiobasal epilepsy and would require grids tho if has unilateral temporal neocortical epilepsy prognosis for cure may be reasonable. He continues unexcited about intracranial recording. Feels current situation is fine and does not want to proceed with intracranial recording or or even try other AEDs at this time.      PLAN:    1.  Continue current levetiracetam and oxcarbazepine. Refiilled for one year.  2. RTC 6 months.   3. Consider moving to  lamotrigine with levetiracetam next if wants AED changes. Provided with references for [facility] in case he is interested.      KARIS FRANK MD

## 2018-04-09 DIAGNOSIS — G40.209 LOCALZ-RLTD SYMPTOMATIC EPILEPSY W CMPLX PART SZ, NOTINTRAC, WO STATUS (H): ICD-10-CM

## 2018-05-04 RX ORDER — LEVETIRACETAM 750 MG/1
TABLET ORAL
Qty: 150 TABLET | Refills: 0 | OUTPATIENT
Start: 2018-05-04

## 2018-05-04 NOTE — TELEPHONE ENCOUNTER
Per chart review. Patient already received a Rx for 1 year. Refill refused.    levETIRAcetam (KEPPRA) 750 MG tablet 150 tablet 11 1/26/2018  No   Sig: Take two and one half tablets twice per day (total 3750 mg per day)   Class: E-Prescribe   Order: [id number]   E-Prescribing Status: Receipt confirmed by pharmacy (1/26/2018  3:46 PM CST)

## 2018-05-25 ENCOUNTER — TELEPHONE (OUTPATIENT)
Dept: NEUROLOGY | Facility: CLINIC | Age: 28
End: 2018-05-25

## 2018-05-25 NOTE — TELEPHONE ENCOUNTER
Call placed to patient, left message to call back.    There are two level four adult comprehensive epilepsy centers in the area he requested, based on the National Association of Epilepsy Centers web site.    [facility]  [address]  [address] [place] [address]  Appointments: [phone]      [facility]  [address] [place] [address] [phone]      Will recommend a letter of introduction, and patient can chose (perhaps based on insurance) where he would like to be seen

## 2018-05-25 NOTE — TELEPHONE ENCOUNTER
----- Message from Marianne Bui MA sent at 5/24/2018  4:16 PM CDT -----  Regarding: Referral for new provider  Contact: [phone]  Caller: [place]    Relationship to Patient: Self    Call Back Number: [phone]    Reason for Call: Pt is moving to [place] and would like to get a referral for a new provider in the [place] area.

## 2018-12-04 ENCOUNTER — TELEPHONE (OUTPATIENT)
Dept: NEUROLOGY | Facility: CLINIC | Age: 28
End: 2018-12-04

## 2018-12-04 DIAGNOSIS — G40.119 TEMPORAL LOBE EPILEPSY, INTRACTABLE (H): Primary | ICD-10-CM

## 2018-12-04 RX ORDER — OXCARBAZEPINE 600 MG/1
TABLET, FILM COATED ORAL
Qty: 155 TABLET | Refills: 3 | Status: SHIPPED | OUTPATIENT
Start: 2018-12-04 | End: 2019-04-15

## 2018-12-04 NOTE — TELEPHONE ENCOUNTER
Nurse received call from pharmacy asking that script be restructured ( same dosing ) to decrease number of tabs which will allow for insurance coverage.  Change from 5 - 300 mg tabs BID to 2.5 - 600 mg tabs BID  Scripts rewritten and forwarded to pharmacy.

## 2018-12-05 ENCOUNTER — TELEPHONE (OUTPATIENT)
Dept: NEUROLOGY | Facility: CLINIC | Age: 28
End: 2018-12-05

## 2018-12-05 NOTE — TELEPHONE ENCOUNTER
Health Call Center    Phone Message    May a detailed message be left on voicemail: yes    Reason for Call: Other: Chiquita calling from [facility] in [place], [place] in regards to OXcarbazepine (TRILEPTAL) prescription that pt is taking. Insurance will only cover two tablets a day, so they will not cover the current prescription of 2 1/2 tablets a day. Please give Chiquita a call back at [phone] to discuss.        Action Taken: Message routed to:  [facility] ([facility] Neurology

## 2018-12-05 NOTE — TELEPHONE ENCOUNTER
Prior Authorization Retail Medication Request    Medication/Dose: Oxcarbazepine 57944 BID    ICD code (if different than what is on RX): G40.219   Previously Tried and Failed:  phenytoin, levetiracetam, high dose lacosamide  Rationale:  ineffective    Insurance Name:  [employer]ICA CHOICE  Insurance ID:  [id number]       Pharmacy Information (if different than what is on RX)  Name: [facility] [address] [place] [address] AT [facility]    Phone:  [phone]

## 2018-12-07 NOTE — TELEPHONE ENCOUNTER
Central Prior Authorization Team   Phone: [phone]    No P/A is needed. Spoke to [facility] @ [phone], and found that this does not need a Prior Authorization, and pharmacy just needs to call their pharmacy help desk for help with processing the claim for the full quantity (they processed qty 26 tabs on Dec 6th). I left a voicemail for pharmacy stating this, and gave my direct phone.

## 2018-12-07 NOTE — TELEPHONE ENCOUNTER
Had to call the local [facility] on patient's file ([address]) to get patient's insurance info that is giving the P/A needed message. Spoke to a technician that stated the rx is being processed at store # [id number], and patient filled a partial quantity of 26 tabs yesterday (12/06/18) for a $10.00 copay.     Store [id number]  [address] [place] [address] [phone]    Insurance is [facility]  Bin: [id number]  PCN: [id number]  Group: [id number]  ID: [id number]

## 2019-04-11 DIAGNOSIS — G40.209 LOCALZ-RLTD SYMPTOMATIC EPILEPSY W CMPLX PART SZ, NOTINTRAC, WO STATUS (H): ICD-10-CM

## 2019-04-11 DIAGNOSIS — G40.119 TEMPORAL LOBE EPILEPSY, INTRACTABLE (H): ICD-10-CM

## 2019-04-12 RX ORDER — OXCARBAZEPINE 600 MG/1
TABLET, FILM COATED ORAL
Qty: 155 TABLET | Refills: 0 | OUTPATIENT
Start: 2019-04-12

## 2019-04-12 RX ORDER — LEVETIRACETAM 750 MG/1
TABLET ORAL
Qty: 150 TABLET | Refills: 0 | OUTPATIENT
Start: 2019-04-12

## 2019-04-15 DIAGNOSIS — G40.119 TEMPORAL LOBE EPILEPSY, INTRACTABLE (H): ICD-10-CM

## 2019-04-15 RX ORDER — OXCARBAZEPINE 600 MG/1
TABLET, FILM COATED ORAL
Qty: 155 TABLET | Refills: 0 | OUTPATIENT
Start: 2019-04-15

## 2019-04-15 RX ORDER — LEVETIRACETAM 750 MG/1
TABLET ORAL
Qty: 150 TABLET | Refills: 0 | Status: SHIPPED | OUTPATIENT
Start: 2019-04-15 | End: 2019-05-08

## 2019-04-15 RX ORDER — OXCARBAZEPINE 600 MG/1
TABLET, FILM COATED ORAL
Qty: 155 TABLET | Refills: 0 | Status: SHIPPED | OUTPATIENT
Start: 2019-04-15 | End: 2019-05-08

## 2019-04-15 NOTE — TELEPHONE ENCOUNTER
Pt is flying back to [place] to continue care with us. He made an appt for 5/8/19 and was looking for a temporary supply to last until that appt.

## 2019-05-08 ENCOUNTER — OFFICE VISIT (OUTPATIENT)
Dept: NEUROLOGY | Facility: CLINIC | Age: 29
End: 2019-05-08
Payer: COMMERCIAL

## 2019-05-08 VITALS
WEIGHT: 223.8 LBS | SYSTOLIC BLOOD PRESSURE: 170 MMHG | HEART RATE: 76 BPM | DIASTOLIC BLOOD PRESSURE: 94 MMHG | BODY MASS INDEX: 30.35 KG/M2

## 2019-05-08 DIAGNOSIS — G40.119 TEMPORAL LOBE EPILEPSY, INTRACTABLE (H): ICD-10-CM

## 2019-05-08 DIAGNOSIS — G40.209 LOCALZ-RLTD SYMPTOMATIC EPILEPSY W CMPLX PART SZ, NOTINTRAC, WO STATUS (H): ICD-10-CM

## 2019-05-08 RX ORDER — LEVETIRACETAM 750 MG/1
TABLET ORAL
Qty: 150 TABLET | Refills: 11 | Status: SHIPPED | OUTPATIENT
Start: 2019-05-08

## 2019-05-08 RX ORDER — OXCARBAZEPINE 600 MG/1
TABLET, FILM COATED ORAL
Qty: 155 TABLET | Refills: 11 | Status: SHIPPED | OUTPATIENT
Start: 2019-05-08

## 2019-05-08 ASSESSMENT — PAIN SCALES - GENERAL: PAINLEVEL: NO PAIN (0)

## 2019-05-08 NOTE — LETTER
Patient:  India Hylton  :   1990  MRN:     [id number]        Mr.Braimah Ralph Hylton  [address] [facility] [address] [facility] [address]    Qi 10, 2019    Dear Mr.Boyd Hylton,    We are writing to inform you of your test results.    Your test results fall within the expected range(s) or remain unchanged from previous results.  Please continue with current treatment plan.    Resulted Orders   Sodium   Result Value Ref Range    Sodium 133 133 - 144 mmol/L   Oxcarbazepine level   Result Value Ref Range    10 Hydroxy Metabolite Level 31.6 10.0 - 35.0 ug/ml      Comment:      (Note)             Therapeutic efficacy has been demonstrated in             patients with trough 10-hydroxy metabolite             concentrations of 10.0 - 35.0 ug/ml.             Toxic concentrations have not been established.  Analysis performed by [facility] [place] [address]         Your oxcarbazepine level is high therapeutic. The oxcarbazepine is not significantly affecting the salt levels in your blood. You may want to share these results with your new seizure doctors after you move to [place]. Good luck with the move!    Sincerely,    Ranjit Goddard MD

## 2019-05-08 NOTE — PROGRESS NOTES
[facility] Progress Note      Patient:  India Hylton  :  1990   Age:  28 year old   Today's Office Visit:  2019    Epilepsy Data:    Patient History  Primary Epileptologist/Provider: Ranjit Goddard M.D.  Patient Status: Not yet controlled  Epilepsy Syndrome: Localization-related epilepsy unspecified  Epilepsy Syndrome Status: Preliminary  Age of Onset: 24yo(22 yo)     Tests/Surgery History  Last EEG: 2016  Last MRI: 2014    Seizure Record  Current Visit Date: 19  Previous Visit Date: 18  Months since last visit: 15.34  Seizure Type 1: Simple partial onset followed by impairment of consciousness  Description of Sz Type 1: joselyn vu followed by stare, amnesia, automatisms.  Seizure Type 2: Partial seizures with secondary generalization  - with simple partial seizures evolving to generalized seizures  Description of Sz Type 2: joselyn vu followed by collapse, amnesia, stiffening, jerking, often TB    History of Present Illness:     Seizures continue at about 2 to 3 per month.  More recently a little less frequent. Had two months seizure free in last 6 months.  Has had three seizures since ; two of which were focal impaired, other focal unimpaired.    No GTCs.      Current Outpatient Medications   Medication Sig Dispense Refill     levETIRAcetam (KEPPRA) 750 MG tablet Take two and one half tablets twice per day (total 3750 mg per day) 150 tablet 0     OXcarbazepine (TRILEPTAL) 600 MG tablet Take 2 1/2 tabs ( 1500 mg ) Twice Daily 155 tablet 0        Medication Notes:    No side effects unless taking on empty stomach. Not using medical cannabis.  AED Medication Compliance:  compliant all of the time  Using a pill box:  Yes    Review of Systems:  Denies headaches. Denies loss of vision. Denies double vision. Denies dysphagia. Denies cough, wheezing, hemoptysis. Denies chest pain, leg swelling. Denies significant weight change, abdominal pain, nausea, vomiting,  change in bowel habits, blood per rectum. Denies dysuria, hematuria, flank pain, or kidney stones.  Have you experienced a traumatic fall since your last visit: NO  Are these falls related to your seizures: Not Applicable    Other Issues:  \  No other health troubles. Employed as .  Living in [facility] [place] and would like to follow with neurologist there.  Is patient safe to drive:  No; however drives on occasion.    Exam:    BP (!) 170/94   Pulse 76   Wt 223 lb 12.8 oz (101.5 kg)   BMI 30.35 kg/m       Wt Readings from Last 5 Encounters:   05/08/19 223 lb 12.8 oz (101.5 kg)   01/26/18 231 lb 12.8 oz (105.1 kg)   09/22/17 227 lb (103 kg)   04/03/17 226 lb (102.5 kg)   03/10/17 223 lb (101.2 kg)     PHYSICAL EXAMINATION:  Normal straightaway gait including tandem, Romberg, 1-foot stand.  Visual fields are full.  Extraocular movements intact.  Smile symmetrical.  Facial sensation equal.  Tongue is midline and strong without signs laceration.  There is no drift, pronation or tremor.  Proximal and distal strength full in upper and lower extremities.  Finger-finger-nose and heel-knee-shin is done well. Mental status exam is grossly intact and no obvious indications of depression or anxiety on examination.    IMPRESSION:    1.  Good story for localization-related epilepsy; simple to complex partial seizures, occasional secondary generalization.  Clinical features are consistent with temporal lobe onset or early temporal lobe propagation. The clinical features of most seizures suggest more a nondominant than a dominant temporal lobe onset. 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  EEG at [facility] with left temporal irritative zone EEG done at [facility] suggests right temporal irritative zone.  This also supports bitemporal epilepsy.  3 T JANE with minor left temporal findings. History not strongly suggestive of mesial  temporal epilepsy. Overall would need intracranial recording if we were to proceed with resective surgery. He has not been interested in surgery or invasive monitoring.  2.  Refractory to phenytoin, levetiracetam, high dose lacosamide and probably oxcarbazepine tho levels not high therapeutic. Overall seizure frequency unchanged since last visit. Patient is happy with current seizure frequency.      DISCUSSION:  Again extensive discussion about treatment options. He medically refractory and further AED treatment not likely to help. On the other hand probably does not have mesiobasal epilepsy and would require grids tho if has unilateral temporal neocortical epilepsy prognosis for cure may be reasonable. He continues unexcited about intracranial recording. Feels current situation is fine and does not want to proceed with intracranial recording or or  try other AEDs at this time.      PLAN:    1.  Continue current levetiracetam and oxcarbazepine. Refiilled for one year.  2. He is planning on moving to [place] and wanted epilepsy center referral there. Provided in AVS.  3. Consider moving to lamotrigine with levetiracetam next if wants AED changes.  4. Emphasized that he cannot drive until seizure free for a sufficient period of time. Tho in [place] this is 3 months, given his long history of refractory seizures I believe he should be seizure free for longer, probably 6 months before he drives.          KARIS FRANK MD

## 2019-05-08 NOTE — LETTER
2019     RE: India Hylton  : 1990   MRN: [id number]      Dear Colleague,    Thank you for referring your patient, India Hylton, to the [facility] EPILEPSY CARE at [facility]. Please see a copy of my visit note below.      [facility]      Patient:  India Hylton  :  1990   Age:  28 year old   Today's Office Visit:  5/15/2019      Patient History  Primary Epileptologist/Provider: Ranjit Goddard M.D.  Patient Status: Not yet controlled  Epilepsy Syndrome: Localization-related epilepsy unspecified  Epilepsy Syndrome Status: Preliminary  Age of Onset: 22yo(24 yo)     Tests/Surgery History  Last EEG: 2016  Last MRI: 2014    Seizure Record  Current Visit Date: 19  Previous Visit Date: 18  Months since last visit: 15.34  Seizure Type 1: Simple partial onset followed by impairment of consciousness  Description of Sz Type 1: joselyn vu followed by stare, amnesia, automatisms.  Seizure Type 2: Partial seizures with secondary generalization  - with simple partial seizures evolving to generalized seizures  Description of Sz Type 2: joselyn vu followed by collapse, amnesia, stiffening, jerking, often TB    History of Present Illness:     SEizures continue at about 2 to 3 per month.  More recently a little less frequent. Had two months seizure free in last 6 months.  Has had three seizures since ; two of which were focal impaired, other focal unimpaired.    No GTCs.      Current Outpatient Medications   Medication Sig Dispense Refill     levETIRAcetam (KEPPRA) 750 MG tablet Take two and one half tablets twice per day (total 3750 mg per day) 150 tablet 0     OXcarbazepine (TRILEPTAL) 600 MG tablet Take 2 1/2 tabs ( 1500 mg ) Twice Daily 155 tablet 0        Medication Notes:  No side effects unless taking on empty stomach. Not using medical cannabis.      AED Medication Compliance:  compliant all of the  time  Using a pill box:  Yes    Review of Systems:  Denies headaches. Denies loss of vision. Denies double vision. Denies dysphagia. Denies cough, wheezing, hemoptysis. Denies chest pain, leg swelling. Denies significant weight change, abdominal pain, nausea, vomiting, change in bowel habits, blood per rectum. Denies dysuria, hematuria, flank pain, or kidney stones. Denies falls or fractures.  Have you experienced a traumatic fall since your last visit: NO  Are these falls related to your seizures: Not Applicable    Other Issues:  No other health troubles. Employed as Barista.  Is patient safe to drive:  No    Exam:    BP (!) 170/94   Pulse 76   Wt 223 lb 12.8 oz (101.5 kg)   BMI 30.35 kg/m        Wt Readings from Last 5 Encounters:   05/08/19 223 lb 12.8 oz (101.5 kg)   01/26/18 231 lb 12.8 oz (105.1 kg)   09/22/17 227 lb (103 kg)   04/03/17 226 lb (102.5 kg)   03/10/17 223 lb (101.2 kg)     PHYSICAL EXAMINATION:  Normal straightaway gait including tandem, Romberg, 1-foot stand.  Visual fields are full.  Extraocular movements intact.  Smile symmetrical.  Facial sensation equal.  Tongue is midline and strong without signs laceration.  There is no drift, pronation or tremor.  Proximal and distal strength full in upper and lower extremities.  Finger-finger-nose and heel-knee-shin is done well. Mental status exam is grossly intact and no obvious indications of depression or anxiety on examination.    IMPRESSION:    1.  Good story for localization-related epilepsy; simple to complex partial seizures, occasional secondary generalization.  Clinical features are consistent with temporal lobe onset or early temporal lobe propagation. The clinical features of most seizures suggest more a nondominant than a dominant temporal lobe onset. 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  EEG at [facility] with left temporal irritative zone  EEG done at [facility] suggests right temporal irritative zone.  This also supports bitemporal epilepsy.  3 T JANE with minor left temporal findings. History not strongly suggestive of mesial temporal epilepsy. Overall would need intracranial recording if we were to proceed with resective surgery. He is not interested in either epilepsy surgery or invasive monitoring at this point in time. He is happy with current degree of seizure control.  2.  Refractory to phenytoin, levetiracetam, high dose lacosamide and probably oxcarbazepine tho levels not high therapeutic.       DISCUSSION:  Again extensive discussion about treatment options. He medically refractory and further AED treatment not likely to help. On the other hand probably does not have mesiobasal epilepsy and would require grids tho if has unilateral temporal neocortical epilepsy prognosis for cure may be reasonable. He continues unexcited about intracranial recording. Feels current situation is fine and does not want to proceed with intracranial recording or or even try other AEDs at this time.      PLAN:    1.  Continue current levetiracetam and oxcarbazepine. Refiilled for one year.  2. Will be moving to [place] and wanted to be seen at an epilepsy center there; name and phone number provided in AVS.  3. Consider moving to lamotrigine with levetiracetam next if wants AED changes.  4. Told he cannot drive at this time. Tho in [place] law states driving may be allowed after 3 months of seizure freedom, given the long term history of his epilepsy I do not think he can drive safely unless longer period of complete seizure freedom, perhaps 6 months. Safety issues pertaining to seizures including but not limited to avoidance of bathtub baths, extensive supervision during swimming, avoidance of exotic hobbies including snorkelling and scuba diving, and avoidance of other situations in which a seizure might lead to significant trauma discussed.         KARIS ABDI  MD ZAC           [facility] Progress Note      Patient:  India Hylton  :  1990   Age:  28 year old   Today's Office Visit:  2019    Epilepsy Data:    Patient History  Primary Epileptologist/Provider: Ranjit Goddard M.D.  Patient Status: Not yet controlled  Epilepsy Syndrome: Localization-related epilepsy unspecified  Epilepsy Syndrome Status: Preliminary  Age of Onset: 24yo(22 yo)     Tests/Surgery History  Last EEG: 2016  Last MRI: 2014    Seizure Record  Current Visit Date: 19  Previous Visit Date: 18  Months since last visit: 15.34  Seizure Type 1: Simple partial onset followed by impairment of consciousness  Description of Sz Type 1: joselyn vu followed by stare, amnesia, automatisms.  Seizure Type 2: Partial seizures with secondary generalization  - with simple partial seizures evolving to generalized seizures  Description of Sz Type 2: joselyn vu followed by collapse, amnesia, stiffening, jerking, often TB    History of Present Illness:     Seizures continue at about 2 to 3 per month.  More recently a little less frequent. Had two months seizure free in last 6 months.  Has had three seizures since ; two of which were focal impaired, other focal unimpaired.    No GTCs.      Current Outpatient Medications   Medication Sig Dispense Refill     levETIRAcetam (KEPPRA) 750 MG tablet Take two and one half tablets twice per day (total 3750 mg per day) 150 tablet 0     OXcarbazepine (TRILEPTAL) 600 MG tablet Take 2 1/2 tabs ( 1500 mg ) Twice Daily 155 tablet 0        Medication Notes:    No side effects unless taking on empty stomach. Not using medical cannabis.  AED Medication Compliance:  compliant all of the time  Using a pill box:  Yes    Review of Systems:  Denies headaches. Denies loss of vision. Denies double vision. Denies dysphagia. Denies cough, wheezing, hemoptysis. Denies chest pain, leg swelling. Denies significant weight change, abdominal  pain, nausea, vomiting, change in bowel habits, blood per rectum. Denies dysuria, hematuria, flank pain, or kidney stones.  Have you experienced a traumatic fall since your last visit: NO  Are these falls related to your seizures: Not Applicable    Other Issues:  \other health troubles. Employed as .  Living in [place] and would like to follow with neurologist there.  Is patient safe to drive:  No; however drives on occasion.    Exam:    BP (!) 170/94   Pulse 76   Wt 223 lb 12.8 oz (101.5 kg)   BMI 30.35 kg/m        Wt Readings from Last 5 Encounters:   05/08/19 223 lb 12.8 oz (101.5 kg)   01/26/18 231 lb 12.8 oz (105.1 kg)   09/22/17 227 lb (103 kg)   04/03/17 226 lb (102.5 kg)   03/10/17 223 lb (101.2 kg)     PHYSICAL EXAMINATION:  Normal straightaway gait including tandem, Romberg, 1-foot stand.  Visual fields are full.  Extraocular movements intact.  Smile symmetrical.  Facial sensation equal.  Tongue is midline and strong without signs laceration.  There is no drift, pronation or tremor.  Proximal and distal strength full in upper and lower extremities.  Finger-finger-nose and heel-knee-shin is done well. Mental status exam is grossly intact and no obvious indications of depression or anxiety on examination.    IMPRESSION:    1.  Good story for localization-related epilepsy; simple to complex partial seizures, occasional secondary generalization.  Clinical features are consistent with temporal lobe onset or early temporal lobe propagation. The clinical features of most seizures suggest more a nondominant than a dominant temporal lobe onset. 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  EEG at [facility] with left temporal irritative zone EEG done at [facility] suggests right temporal irritative zone.  This also supports bitemporal epilepsy.  3 T JANE with minor left temporal findings. History not strongly  suggestive of mesial temporal epilepsy. Overall would need intracranial recording if we were to proceed with resective surgery. He has not been interested in surgery or invasive monitoring.  2.  Refractory to phenytoin, levetiracetam, high dose lacosamide and probably oxcarbazepine tho levels not high therapeutic. Overall seizure frequency unchanged since last visit. Patient is happy with current seizure frequency.      DISCUSSION:  Again extensive discussion about treatment options. He medically refractory and further AED treatment not likely to help. On the other hand probably does not have mesiobasal epilepsy and would require grids tho if has unilateral temporal neocortical epilepsy prognosis for cure may be reasonable. He continues unexcited about intracranial recording. Feels current situation is fine and does not want to proceed with intracranial recording or or  try other AEDs at this time.      PLAN:    1.  Continue current levetiracetam and oxcarbazepine. Refiilled for one year.  2. He is planning on moving to [place] and wanted epilepsy center referral there. Provided in AVS.  3. Consider moving to lamotrig[place] with levetiracetam next if wants AED changes.  4. Emphasized that he cannot drive until seizure free for a sufficient period of time. Tho in [place] this is 3 months, given his long history of refractory seizures I believe he should be seizure free for longer, probably 6 months before he drives.      KARIS FRANK MD

## 2019-05-08 NOTE — PATIENT INSTRUCTIONS
Keep taking medicaitons the way youi are now.  You should not be driving unless you are seizure free.    Good epilepsy center is at    [facility] at [address]  [address]  [facility]  [address]    [phone]    Good website for epilepsy care is website for [employer] of Epilepsy Centers.

## 2019-05-08 NOTE — PROGRESS NOTES
[facility] Epilepsy Care Progress Note      Patient:  India Hylton  :  1990   Age:  28 year old   Today's Office Visit:  5/15/2019      Patient History  Primary Epileptologist/Provider: Ranjit Goddard M.D.  Patient Status: Not yet controlled  Epilepsy Syndrome: Localization-related epilepsy unspecified  Epilepsy Syndrome Status: Preliminary  Age of Onset: 22yo(22 yo)     Tests/Surgery History  Last EEG: 2016  Last MRI: 2014    Seizure Record  Current Visit Date: 19  Previous Visit Date: 18  Months since last visit: 15.34  Seizure Type 1: Simple partial onset followed by impairment of consciousness  Description of Sz Type 1: joselyn vu followed by stare, amnesia, automatisms.  Seizure Type 2: Partial seizures with secondary generalization  - with simple partial seizures evolving to generalized seizures  Description of Sz Type 2: joselyn vu followed by collapse, amnesia, stiffening, jerking, often TB    History of Present Illness:     SEizures continue at about 2 to 3 per month.  More recently a little less frequent. Had two months seizure free in last 6 months.  Has had three seizures since ; two of which were focal impaired, other focal unimpaired.    No GTCs.      Current Outpatient Medications   Medication Sig Dispense Refill     levETIRAcetam (KEPPRA) 750 MG tablet Take two and one half tablets twice per day (total 3750 mg per day) 150 tablet 0     OXcarbazepine (TRILEPTAL) 600 MG tablet Take 2 1/2 tabs ( 1500 mg ) Twice Daily 155 tablet 0        Medication Notes:  No side effects unless taking on empty stomach. Not using medical cannabis.      AED Medication Compliance:  compliant all of the time  Using a pill box:  Yes    Review of Systems:  Denies headaches. Denies loss of vision. Denies double vision. Denies dysphagia. Denies cough, wheezing, hemoptysis. Denies chest pain, leg swelling. Denies significant weight change, abdominal pain, nausea, vomiting, change in  bowel habits, blood per rectum. Denies dysuria, hematuria, flank pain, or kidney stones. Denies falls or fractures.  Have you experienced a traumatic fall since your last visit: NO  Are these falls related to your seizures: Not Applicable    Other Issues:  No other health troubles. Employed as .  Is patient safe to drive:  No    Exam:    BP (!) 170/94   Pulse 76   Wt 223 lb 12.8 oz (101.5 kg)   BMI 30.35 kg/m       Wt Readings from Last 5 Encounters:   05/08/19 223 lb 12.8 oz (101.5 kg)   01/26/18 231 lb 12.8 oz (105.1 kg)   09/22/17 227 lb (103 kg)   04/03/17 226 lb (102.5 kg)   03/10/17 223 lb (101.2 kg)     PHYSICAL EXAMINATION:  Normal straightaway gait including tandem, Romberg, 1-foot stand.  Visual fields are full.  Extraocular movements intact.  Smile symmetrical.  Facial sensation equal.  Tongue is midline and strong without signs laceration.  There is no drift, pronation or tremor.  Proximal and distal strength full in upper and lower extremities.  Finger-finger-nose and heel-knee-shin is done well. Mental status exam is grossly intact and no obvious indications of depression or anxiety on examination.    IMPRESSION:    1.  Good story for localization-related epilepsy; simple to complex partial seizures, occasional secondary generalization.  Clinical features are consistent with temporal lobe onset or early temporal lobe propagation. The clinical features of most seizures suggest more a nondominant than a dominant temporal lobe onset. 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  EEG at [facility] with left temporal irritative zone EEG done at [facility] suggests right temporal irritative zone.  This also supports bitemporal epilepsy.  3 T JANE with minor left temporal findings. History not strongly suggestive of mesial temporal epilepsy. Overall would need intracranial recording if we were to proceed  with resective surgery. He is not interested in either epilepsy surgery or invasive monitoring at this point in time. He is happy with current degree of seizure control.  2.  Refractory to phenytoin, levetiracetam, high dose lacosamide and probably oxcarbazepine tho levels not high therapeutic.       DISCUSSION:  Again extensive discussion about treatment options. He medically refractory and further AED treatment not likely to help. On the other hand probably does not have mesiobasal epilepsy and would require grids tho if has unilateral temporal neocortical epilepsy prognosis for cure may be reasonable. He continues unexcited about intracranial recording. Feels current situation is fine and does not want to proceed with intracranial recording or or even try other AEDs at this time.      PLAN:    1.  Continue current levetiracetam and oxcarbazepine. Refiilled for one year.  2. Will be moving to [place] and wanted to be seen at an epilepsy center there; name and phone number provided in AVS.  3. Consider moving to lamotrig[place] with levetiracetam next if wants AED changes.  4. Told he cannot drive at this time. Tho in [place] law states driving may be allowed after 3 months of seizure freedom, given the long term history of his epilepsy I do not think he can drive safely unless longer period of complete seizure freedom, perhaps 6 months. Safety issues pertaining to seizures including but not limited to avoidance of bathtub baths, extensive supervision during swimming, avoidance of exotic hobbies including snorkelling and scuba diving, and avoidance of other situations in which a seizure might lead to significant trauma discussed.         KARIS FRANK MD

## 2019-05-09 LAB — SODIUM SERPL-SCNC: 133 MMOL/L (ref 133–144)

## 2019-05-11 LAB — 10OH-CARBAZEPINE SERPL-MCNC: 31.6 UG/ML (ref 10–35)

## 2019-07-17 ENCOUNTER — TELEPHONE (OUTPATIENT)
Dept: NEUROLOGY | Facility: CLINIC | Age: 29
End: 2019-07-17

## 2019-07-17 NOTE — TELEPHONE ENCOUNTER
Prior Authorization Retail Medication Request    Medication/Dose: Oxcarbazepine 600mg tabs  ICD code (if different than what is on RX):    Previously Tried and Failed:    Rationale:      Insurance Name:    Insurance ID:        Pharmacy Information (if different than what is on RX)  Name:    Phone:      Plan limitation of maximum daily dose of 4 exceeded for this medication.

## 2019-07-24 NOTE — TELEPHONE ENCOUNTER
Prior Authorization Not Needed per Insurance    Medication: OXcarbazepine (TRILEPTAL) 600 MG tablet-PA NOT NEEDED  Insurance Company: [employer] - Phone [phone] Fax [phone]  Expected CoPay:      Pharmacy Filling the Rx: [facility] [id number] [place] [id number] [facility] AT [facility] & [address] [id number]  Pharmacy Notified: Yes  Patient Notified: No    Called pharmacy and pharmacy stated that PA is Not Needed and medication is covered. Pharmacy stated that they have a paid claim. **Instructed pharmacy to notify patient when script is ready to /ship.** Pharmacy stated that they will notify patient when medication is ready for . Called insurance at [phone] and insurance stated that PA is Not Needed and medication is covered. Rep stated that the pharmacy received an rejection of High Dose Alert but was able to process medication with a paid claim on 7/22/2019 for a quantity 300 for 88 day supply. Requested for a reference # and was told that Reference # can not be provided and patients ID # is the reference # for the call.

## 2020-01-23 ENCOUNTER — TELEPHONE (OUTPATIENT)
Dept: NEUROLOGY | Facility: CLINIC | Age: 30
End: 2020-01-23

## 2020-01-23 DIAGNOSIS — G40.119 TEMPORAL LOBE EPILEPSY, INTRACTABLE (H): Primary | ICD-10-CM

## 2020-01-23 NOTE — TELEPHONE ENCOUNTER
What is the concern that needs to be addressed by a nurse? Pt would like a neurology referral.     Fax: [phone], [facility]    May a detailed message be left on voicemail? yes    Date of last office visit: 5/8/19    Message routed to: nurse      PLAN: Discussed with Dr. Lyle Craig to prepare an Epilepsy Adult Referral.    Epilepsy Adult Referral to the [facility] has been prepared.     Will mail the referral to the patient's home address.